# Patient Record
Sex: MALE | Race: WHITE | Employment: FULL TIME | URBAN - METROPOLITAN AREA
[De-identification: names, ages, dates, MRNs, and addresses within clinical notes are randomized per-mention and may not be internally consistent; named-entity substitution may affect disease eponyms.]

---

## 2018-10-26 ENCOUNTER — NURSE ONLY (OUTPATIENT)
Dept: FAMILY MEDICINE CLINIC | Age: 53
End: 2018-10-26
Payer: COMMERCIAL

## 2018-10-26 DIAGNOSIS — Z23 NEED FOR INFLUENZA VACCINATION: Primary | ICD-10-CM

## 2018-10-26 PROCEDURE — 90471 IMMUNIZATION ADMIN: CPT | Performed by: FAMILY MEDICINE

## 2018-10-26 PROCEDURE — 90686 IIV4 VACC NO PRSV 0.5 ML IM: CPT | Performed by: FAMILY MEDICINE

## 2019-01-07 RX ORDER — HYDROCHLOROTHIAZIDE 25 MG/1
25 TABLET ORAL DAILY
Qty: 90 TABLET | Refills: 0 | Status: SHIPPED | OUTPATIENT
Start: 2019-01-07 | End: 2019-04-05 | Stop reason: SDUPTHER

## 2019-01-24 RX ORDER — AMLODIPINE BESYLATE 10 MG/1
10 TABLET ORAL DAILY
Qty: 90 TABLET | Refills: 3 | Status: SHIPPED | OUTPATIENT
Start: 2019-01-24 | End: 2020-01-08

## 2019-01-24 RX ORDER — OLMESARTAN MEDOXOMIL 40 MG/1
40 TABLET ORAL DAILY
Qty: 90 TABLET | Refills: 3 | Status: SHIPPED | OUTPATIENT
Start: 2019-01-24 | End: 2020-01-08

## 2019-01-25 ENCOUNTER — NURSE ONLY (OUTPATIENT)
Dept: FAMILY MEDICINE CLINIC | Age: 54
End: 2019-01-25
Payer: COMMERCIAL

## 2019-01-25 ENCOUNTER — HOSPITAL ENCOUNTER (OUTPATIENT)
Age: 54
Discharge: HOME | End: 2019-01-27
Payer: COMMERCIAL

## 2019-01-25 DIAGNOSIS — R73.9 HYPERGLYCEMIA: ICD-10-CM

## 2019-01-25 DIAGNOSIS — I10 ESSENTIAL HYPERTENSION: ICD-10-CM

## 2019-01-25 DIAGNOSIS — E55.9 VITAMIN D DEFICIENCY: ICD-10-CM

## 2019-01-25 DIAGNOSIS — I10 ESSENTIAL HYPERTENSION: Primary | ICD-10-CM

## 2019-01-25 LAB
ALBUMIN SERPL-MCNC: 4.5 G/DL (ref 3.5–5.2)
ALP BLD-CCNC: 61 U/L (ref 40–129)
ALT SERPL-CCNC: 46 U/L (ref 0–40)
ANION GAP SERPL CALCULATED.3IONS-SCNC: 15 MMOL/L (ref 7–16)
AST SERPL-CCNC: 32 U/L (ref 0–39)
BASOPHILS ABSOLUTE: 0.02 E9/L (ref 0–0.2)
BASOPHILS RELATIVE PERCENT: 0.3 % (ref 0–2)
BILIRUB SERPL-MCNC: 0.9 MG/DL (ref 0–1.2)
BUN BLDV-MCNC: 14 MG/DL (ref 6–20)
CALCIUM SERPL-MCNC: 9 MG/DL (ref 8.6–10.2)
CHLORIDE BLD-SCNC: 101 MMOL/L (ref 98–107)
CHOLESTEROL, TOTAL: 180 MG/DL (ref 0–199)
CO2: 24 MMOL/L (ref 22–29)
CREAT SERPL-MCNC: 0.8 MG/DL (ref 0.7–1.2)
EOSINOPHILS ABSOLUTE: 0.13 E9/L (ref 0.05–0.5)
EOSINOPHILS RELATIVE PERCENT: 1.8 % (ref 0–6)
GFR AFRICAN AMERICAN: >60
GFR NON-AFRICAN AMERICAN: >60 ML/MIN/1.73
GLUCOSE BLD-MCNC: 99 MG/DL (ref 74–99)
HBA1C MFR BLD: 5.5 % (ref 4–5.6)
HCT VFR BLD CALC: 48.6 % (ref 37–54)
HDLC SERPL-MCNC: 45 MG/DL
HEMOGLOBIN: 16.6 G/DL (ref 12.5–16.5)
IMMATURE GRANULOCYTES #: 0.02 E9/L
IMMATURE GRANULOCYTES %: 0.3 % (ref 0–5)
LDL CHOLESTEROL CALCULATED: 120 MG/DL (ref 0–99)
LYMPHOCYTES ABSOLUTE: 1.46 E9/L (ref 1.5–4)
LYMPHOCYTES RELATIVE PERCENT: 20.3 % (ref 20–42)
MCH RBC QN AUTO: 30.2 PG (ref 26–35)
MCHC RBC AUTO-ENTMCNC: 34.2 % (ref 32–34.5)
MCV RBC AUTO: 88.5 FL (ref 80–99.9)
MONOCYTES ABSOLUTE: 0.63 E9/L (ref 0.1–0.95)
MONOCYTES RELATIVE PERCENT: 8.8 % (ref 2–12)
NEUTROPHILS ABSOLUTE: 4.94 E9/L (ref 1.8–7.3)
NEUTROPHILS RELATIVE PERCENT: 68.5 % (ref 43–80)
PDW BLD-RTO: 12.7 FL (ref 11.5–15)
PLATELET # BLD: 220 E9/L (ref 130–450)
PMV BLD AUTO: 10.5 FL (ref 7–12)
POTASSIUM SERPL-SCNC: 3.7 MMOL/L (ref 3.5–5)
RBC # BLD: 5.49 E12/L (ref 3.8–5.8)
SODIUM BLD-SCNC: 140 MMOL/L (ref 132–146)
TOTAL PROTEIN: 7 G/DL (ref 6.4–8.3)
TRIGL SERPL-MCNC: 77 MG/DL (ref 0–149)
VITAMIN D 25-HYDROXY: 43 NG/ML (ref 30–100)
VLDLC SERPL CALC-MCNC: 15 MG/DL
WBC # BLD: 7.2 E9/L (ref 4.5–11.5)

## 2019-01-25 PROCEDURE — 80053 COMPREHEN METABOLIC PANEL: CPT

## 2019-01-25 PROCEDURE — 83036 HEMOGLOBIN GLYCOSYLATED A1C: CPT

## 2019-01-25 PROCEDURE — 85025 COMPLETE CBC W/AUTO DIFF WBC: CPT

## 2019-01-25 PROCEDURE — 36415 COLL VENOUS BLD VENIPUNCTURE: CPT | Performed by: FAMILY MEDICINE

## 2019-01-25 PROCEDURE — 80061 LIPID PANEL: CPT

## 2019-01-25 PROCEDURE — 82306 VITAMIN D 25 HYDROXY: CPT

## 2019-01-29 ENCOUNTER — OFFICE VISIT (OUTPATIENT)
Dept: FAMILY MEDICINE CLINIC | Age: 54
End: 2019-01-29
Payer: COMMERCIAL

## 2019-01-29 VITALS
BODY MASS INDEX: 37.36 KG/M2 | SYSTOLIC BLOOD PRESSURE: 122 MMHG | HEART RATE: 69 BPM | TEMPERATURE: 98.4 F | OXYGEN SATURATION: 98 % | HEIGHT: 69 IN | WEIGHT: 252.2 LBS | RESPIRATION RATE: 16 BRPM | DIASTOLIC BLOOD PRESSURE: 84 MMHG

## 2019-01-29 DIAGNOSIS — E55.9 VITAMIN D DEFICIENCY: ICD-10-CM

## 2019-01-29 DIAGNOSIS — Z12.5 SCREENING FOR PROSTATE CANCER: ICD-10-CM

## 2019-01-29 DIAGNOSIS — E66.01 CLASS 2 SEVERE OBESITY DUE TO EXCESS CALORIES WITH SERIOUS COMORBIDITY AND BODY MASS INDEX (BMI) OF 37.0 TO 37.9 IN ADULT (HCC): ICD-10-CM

## 2019-01-29 DIAGNOSIS — R73.9 HYPERGLYCEMIA: ICD-10-CM

## 2019-01-29 DIAGNOSIS — R79.89 ELEVATED LFTS: ICD-10-CM

## 2019-01-29 DIAGNOSIS — Z00.00 ANNUAL PHYSICAL EXAM: Primary | ICD-10-CM

## 2019-01-29 DIAGNOSIS — I10 ESSENTIAL HYPERTENSION: ICD-10-CM

## 2019-01-29 PROCEDURE — 99396 PREV VISIT EST AGE 40-64: CPT | Performed by: FAMILY MEDICINE

## 2019-01-29 ASSESSMENT — PATIENT HEALTH QUESTIONNAIRE - PHQ9
SUM OF ALL RESPONSES TO PHQ9 QUESTIONS 1 & 2: 0
SUM OF ALL RESPONSES TO PHQ QUESTIONS 1-9: 0
SUM OF ALL RESPONSES TO PHQ QUESTIONS 1-9: 0
2. FEELING DOWN, DEPRESSED OR HOPELESS: 0
1. LITTLE INTEREST OR PLEASURE IN DOING THINGS: 0

## 2019-04-05 RX ORDER — HYDROCHLOROTHIAZIDE 25 MG/1
TABLET ORAL
Qty: 90 TABLET | Refills: 0 | Status: SHIPPED | OUTPATIENT
Start: 2019-04-05 | End: 2019-07-03 | Stop reason: SDUPTHER

## 2019-07-03 RX ORDER — HYDROCHLOROTHIAZIDE 25 MG/1
TABLET ORAL
Qty: 90 TABLET | Refills: 0 | Status: SHIPPED | OUTPATIENT
Start: 2019-07-03 | End: 2019-10-08 | Stop reason: SDUPTHER

## 2019-10-08 RX ORDER — HYDROCHLOROTHIAZIDE 25 MG/1
TABLET ORAL
Qty: 90 TABLET | Refills: 0 | Status: SHIPPED | OUTPATIENT
Start: 2019-10-08 | End: 2019-10-09 | Stop reason: SDUPTHER

## 2019-10-08 RX ORDER — HYDROCHLOROTHIAZIDE 25 MG/1
TABLET ORAL
Qty: 90 TABLET | Refills: 0 | Status: SHIPPED | OUTPATIENT
Start: 2019-10-08 | End: 2019-10-08 | Stop reason: SDUPTHER

## 2019-10-09 RX ORDER — HYDROCHLOROTHIAZIDE 25 MG/1
TABLET ORAL
Qty: 90 TABLET | Refills: 0 | Status: SHIPPED
Start: 2019-10-09 | End: 2020-03-09

## 2019-10-28 ENCOUNTER — NURSE ONLY (OUTPATIENT)
Dept: FAMILY MEDICINE CLINIC | Age: 54
End: 2019-10-28
Payer: COMMERCIAL

## 2019-10-28 DIAGNOSIS — Z23 NEED FOR INFLUENZA VACCINATION: Primary | ICD-10-CM

## 2019-10-28 PROCEDURE — 90471 IMMUNIZATION ADMIN: CPT | Performed by: FAMILY MEDICINE

## 2019-10-28 PROCEDURE — 90686 IIV4 VACC NO PRSV 0.5 ML IM: CPT | Performed by: FAMILY MEDICINE

## 2019-11-20 ENCOUNTER — NURSE ONLY (OUTPATIENT)
Dept: FAMILY MEDICINE CLINIC | Age: 54
End: 2019-11-20
Payer: COMMERCIAL

## 2019-11-20 ENCOUNTER — HOSPITAL ENCOUNTER (OUTPATIENT)
Age: 54
Discharge: HOME | End: 2019-11-22
Payer: COMMERCIAL

## 2019-11-20 DIAGNOSIS — I10 ESSENTIAL HYPERTENSION: ICD-10-CM

## 2019-11-20 DIAGNOSIS — E55.9 VITAMIN D DEFICIENCY: ICD-10-CM

## 2019-11-20 DIAGNOSIS — Z12.5 SCREENING FOR PROSTATE CANCER: ICD-10-CM

## 2019-11-20 DIAGNOSIS — R73.9 HYPERGLYCEMIA: ICD-10-CM

## 2019-11-20 LAB
ALBUMIN SERPL-MCNC: 4.4 G/DL (ref 3.5–5.2)
ALP BLD-CCNC: 70 U/L (ref 40–129)
ALT SERPL-CCNC: 74 U/L (ref 0–40)
ANION GAP SERPL CALCULATED.3IONS-SCNC: 15 MMOL/L (ref 7–16)
AST SERPL-CCNC: 44 U/L (ref 0–39)
BASOPHILS ABSOLUTE: 0.03 E9/L (ref 0–0.2)
BASOPHILS RELATIVE PERCENT: 0.3 % (ref 0–2)
BILIRUB SERPL-MCNC: 0.7 MG/DL (ref 0–1.2)
BUN BLDV-MCNC: 14 MG/DL (ref 6–20)
CALCIUM SERPL-MCNC: 9.4 MG/DL (ref 8.6–10.2)
CHLORIDE BLD-SCNC: 101 MMOL/L (ref 98–107)
CHOLESTEROL, TOTAL: 191 MG/DL (ref 0–199)
CO2: 23 MMOL/L (ref 22–29)
CREAT SERPL-MCNC: 1 MG/DL (ref 0.7–1.2)
EOSINOPHILS ABSOLUTE: 0.16 E9/L (ref 0.05–0.5)
EOSINOPHILS RELATIVE PERCENT: 1.8 % (ref 0–6)
GFR AFRICAN AMERICAN: >60
GFR NON-AFRICAN AMERICAN: >60 ML/MIN/1.73
GLUCOSE BLD-MCNC: 146 MG/DL (ref 74–99)
HBA1C MFR BLD: 6.4 % (ref 4–5.6)
HCT VFR BLD CALC: 49.9 % (ref 37–54)
HDLC SERPL-MCNC: 52 MG/DL
HEMOGLOBIN: 16.2 G/DL (ref 12.5–16.5)
IMMATURE GRANULOCYTES #: 0.06 E9/L
IMMATURE GRANULOCYTES %: 0.7 % (ref 0–5)
LDL CHOLESTEROL CALCULATED: 115 MG/DL (ref 0–99)
LYMPHOCYTES ABSOLUTE: 1.75 E9/L (ref 1.5–4)
LYMPHOCYTES RELATIVE PERCENT: 19.4 % (ref 20–42)
MCH RBC QN AUTO: 29.8 PG (ref 26–35)
MCHC RBC AUTO-ENTMCNC: 32.5 % (ref 32–34.5)
MCV RBC AUTO: 91.7 FL (ref 80–99.9)
MONOCYTES ABSOLUTE: 0.82 E9/L (ref 0.1–0.95)
MONOCYTES RELATIVE PERCENT: 9.1 % (ref 2–12)
NEUTROPHILS ABSOLUTE: 6.22 E9/L (ref 1.8–7.3)
NEUTROPHILS RELATIVE PERCENT: 68.7 % (ref 43–80)
PDW BLD-RTO: 13.1 FL (ref 11.5–15)
PLATELET # BLD: 230 E9/L (ref 130–450)
PMV BLD AUTO: 10.2 FL (ref 7–12)
POTASSIUM SERPL-SCNC: 3.7 MMOL/L (ref 3.5–5)
PROSTATE SPECIFIC ANTIGEN: 0.87 NG/ML (ref 0–4)
RBC # BLD: 5.44 E12/L (ref 3.8–5.8)
SODIUM BLD-SCNC: 139 MMOL/L (ref 132–146)
TOTAL PROTEIN: 7.2 G/DL (ref 6.4–8.3)
TRIGL SERPL-MCNC: 119 MG/DL (ref 0–149)
VITAMIN D 25-HYDROXY: 43 NG/ML (ref 30–100)
VLDLC SERPL CALC-MCNC: 24 MG/DL
WBC # BLD: 9 E9/L (ref 4.5–11.5)

## 2019-11-20 PROCEDURE — 83036 HEMOGLOBIN GLYCOSYLATED A1C: CPT

## 2019-11-20 PROCEDURE — 82306 VITAMIN D 25 HYDROXY: CPT

## 2019-11-20 PROCEDURE — 36415 COLL VENOUS BLD VENIPUNCTURE: CPT | Performed by: FAMILY MEDICINE

## 2019-11-20 PROCEDURE — 85025 COMPLETE CBC W/AUTO DIFF WBC: CPT

## 2019-11-20 PROCEDURE — G0103 PSA SCREENING: HCPCS

## 2019-11-20 PROCEDURE — 80053 COMPREHEN METABOLIC PANEL: CPT

## 2019-11-20 PROCEDURE — 80061 LIPID PANEL: CPT

## 2019-11-26 ENCOUNTER — OFFICE VISIT (OUTPATIENT)
Dept: FAMILY MEDICINE CLINIC | Age: 54
End: 2019-11-26
Payer: COMMERCIAL

## 2019-11-26 VITALS
SYSTOLIC BLOOD PRESSURE: 130 MMHG | BODY MASS INDEX: 39.34 KG/M2 | HEART RATE: 63 BPM | DIASTOLIC BLOOD PRESSURE: 86 MMHG | TEMPERATURE: 98.7 F | WEIGHT: 265.6 LBS | RESPIRATION RATE: 16 BRPM | OXYGEN SATURATION: 99 % | HEIGHT: 69 IN

## 2019-11-26 DIAGNOSIS — R10.31 RLQ ABDOMINAL PAIN: Primary | ICD-10-CM

## 2019-11-26 DIAGNOSIS — I10 ESSENTIAL HYPERTENSION: ICD-10-CM

## 2019-11-26 DIAGNOSIS — R73.9 HYPERGLYCEMIA: ICD-10-CM

## 2019-11-26 PROCEDURE — 99214 OFFICE O/P EST MOD 30 MIN: CPT | Performed by: FAMILY MEDICINE

## 2019-12-06 ENCOUNTER — HOSPITAL ENCOUNTER (OUTPATIENT)
Dept: CT IMAGING | Age: 54
Discharge: HOME | End: 2019-12-08
Payer: COMMERCIAL

## 2019-12-06 DIAGNOSIS — R10.31 RLQ ABDOMINAL PAIN: ICD-10-CM

## 2019-12-06 PROCEDURE — 2580000003 HC RX 258: Performed by: RADIOLOGY

## 2019-12-06 PROCEDURE — 6360000004 HC RX CONTRAST MEDICATION: Performed by: RADIOLOGY

## 2019-12-06 PROCEDURE — 74177 CT ABD & PELVIS W/CONTRAST: CPT

## 2019-12-06 RX ORDER — SODIUM CHLORIDE 0.9 % (FLUSH) 0.9 %
10 SYRINGE (ML) INJECTION PRN
Status: COMPLETED | OUTPATIENT
Start: 2019-12-06 | End: 2019-12-06

## 2019-12-06 RX ADMIN — IOHEXOL 50 ML: 240 INJECTION, SOLUTION INTRATHECAL; INTRAVASCULAR; INTRAVENOUS; ORAL at 15:14

## 2019-12-06 RX ADMIN — IOPAMIDOL 110 ML: 755 INJECTION, SOLUTION INTRAVENOUS at 16:15

## 2019-12-06 RX ADMIN — SODIUM CHLORIDE, PRESERVATIVE FREE 10 ML: 5 INJECTION INTRAVENOUS at 16:10

## 2020-01-08 RX ORDER — OLMESARTAN MEDOXOMIL 40 MG/1
TABLET ORAL
Qty: 90 TABLET | Refills: 3 | Status: SHIPPED
Start: 2020-01-08 | End: 2020-12-21

## 2020-01-08 RX ORDER — AMLODIPINE BESYLATE 10 MG/1
TABLET ORAL
Qty: 90 TABLET | Refills: 3 | Status: SHIPPED
Start: 2020-01-08 | End: 2020-12-28

## 2020-03-09 RX ORDER — HYDROCHLOROTHIAZIDE 25 MG/1
TABLET ORAL
Qty: 90 TABLET | Refills: 0 | Status: SHIPPED
Start: 2020-03-09 | End: 2020-09-28

## 2020-05-18 RX ORDER — POTASSIUM CHLORIDE 1500 MG/1
TABLET, EXTENDED RELEASE ORAL
Qty: 180 TABLET | Refills: 3 | Status: SHIPPED
Start: 2020-05-18 | End: 2021-05-10

## 2020-09-28 RX ORDER — HYDROCHLOROTHIAZIDE 25 MG/1
TABLET ORAL
Qty: 90 TABLET | Refills: 0 | Status: SHIPPED
Start: 2020-09-28 | End: 2020-10-23 | Stop reason: SDUPTHER

## 2020-10-16 ENCOUNTER — NURSE ONLY (OUTPATIENT)
Dept: FAMILY MEDICINE CLINIC | Age: 55
End: 2020-10-16
Payer: COMMERCIAL

## 2020-10-16 PROCEDURE — 90471 IMMUNIZATION ADMIN: CPT | Performed by: FAMILY MEDICINE

## 2020-10-16 PROCEDURE — 90686 IIV4 VACC NO PRSV 0.5 ML IM: CPT | Performed by: FAMILY MEDICINE

## 2020-10-16 ASSESSMENT — PATIENT HEALTH QUESTIONNAIRE - PHQ9
SUM OF ALL RESPONSES TO PHQ9 QUESTIONS 1 & 2: 0
SUM OF ALL RESPONSES TO PHQ QUESTIONS 1-9: 0
1. LITTLE INTEREST OR PLEASURE IN DOING THINGS: 0
2. FEELING DOWN, DEPRESSED OR HOPELESS: 0
SUM OF ALL RESPONSES TO PHQ QUESTIONS 1-9: 0
SUM OF ALL RESPONSES TO PHQ QUESTIONS 1-9: 0

## 2020-10-23 ENCOUNTER — PATIENT MESSAGE (OUTPATIENT)
Dept: FAMILY MEDICINE CLINIC | Age: 55
End: 2020-10-23

## 2020-10-23 RX ORDER — HYDROCHLOROTHIAZIDE 25 MG/1
TABLET ORAL
Qty: 90 TABLET | Refills: 0 | Status: SHIPPED
Start: 2020-10-23 | End: 2021-01-04

## 2020-10-23 RX ORDER — ERGOCALCIFEROL 1.25 MG/1
50000 CAPSULE ORAL WEEKLY
Qty: 24 CAPSULE | Refills: 1 | Status: SHIPPED
Start: 2020-10-23 | End: 2020-10-26 | Stop reason: SDUPTHER

## 2020-10-23 NOTE — TELEPHONE ENCOUNTER
From: Teofilo Chanel  To: Kasey Escamilla MD  Sent: 10/23/2020 9:56 AM EDT  Subject: Prescription Question    Hi everyone,  next time could you please refill my \"hydroCHLOROthiazide 25 MG tablet\" prescription at the [place] [address] instead of the [facility] local pharmacy in [place], [place]? It is more convenient for me to get the prescription drugs shipped directly to my home.   Many thanks,  Jelani Rodrigues

## 2020-10-26 RX ORDER — ERGOCALCIFEROL 1.25 MG/1
50000 CAPSULE ORAL
Qty: 24 CAPSULE | Refills: 1 | Status: SHIPPED
Start: 2020-10-26 | End: 2021-03-22

## 2020-12-11 ENCOUNTER — PATIENT MESSAGE (OUTPATIENT)
Dept: FAMILY MEDICINE CLINIC | Age: 55
End: 2020-12-11

## 2020-12-11 NOTE — TELEPHONE ENCOUNTER
From: Mimi Javier  To: Jade Weiss MD  Sent: 12/11/2020 7:47 AM EST  Subject: Non-Urgent Medical Question    I have an appointment on Monday morning 12/14/2020 for a fasting lab. In addition to all the tests that Dr. [place] Bernard typically requires, I was wondering if it would be possible to test for Covid-19 antibodies. I suspect I got exposed to the [address] back in February 2020 but I was never tested. I have had no symptoms since March 2020 and I feel just fine, but I am just curious to know if I had the virus 9 months ago. If possible please add the Covid-19 antibodies to the list of tests that the lab will perform.   Many thanks,  Alan Pedroza

## 2020-12-14 ENCOUNTER — NURSE ONLY (OUTPATIENT)
Dept: FAMILY MEDICINE CLINIC | Age: 55
End: 2020-12-14

## 2020-12-15 DIAGNOSIS — I10 ESSENTIAL HYPERTENSION: ICD-10-CM

## 2020-12-15 DIAGNOSIS — R73.9 HYPERGLYCEMIA: ICD-10-CM

## 2020-12-15 DIAGNOSIS — Z12.5 SCREENING PSA (PROSTATE SPECIFIC ANTIGEN): ICD-10-CM

## 2020-12-15 LAB
ALBUMIN SERPL-MCNC: 4.2 G/DL (ref 3.5–5.2)
ALP BLD-CCNC: 67 U/L (ref 40–129)
ALT SERPL-CCNC: 39 U/L (ref 0–40)
ANION GAP SERPL CALCULATED.3IONS-SCNC: 12 MMOL/L (ref 7–16)
AST SERPL-CCNC: 31 U/L (ref 0–39)
BILIRUB SERPL-MCNC: 0.6 MG/DL (ref 0–1.2)
BUN BLDV-MCNC: 10 MG/DL (ref 6–20)
CALCIUM SERPL-MCNC: 9.1 MG/DL (ref 8.6–10.2)
CHLORIDE BLD-SCNC: 106 MMOL/L (ref 98–107)
CHOLESTEROL, TOTAL: 173 MG/DL (ref 0–199)
CO2: 24 MMOL/L (ref 22–29)
CREAT SERPL-MCNC: 0.8 MG/DL (ref 0.7–1.2)
GFR AFRICAN AMERICAN: >60
GFR NON-AFRICAN AMERICAN: >60 ML/MIN/1.73
GLUCOSE BLD-MCNC: 114 MG/DL (ref 74–99)
HBA1C MFR BLD: 6.4 % (ref 4–5.6)
HCT VFR BLD CALC: 47.7 % (ref 37–54)
HDLC SERPL-MCNC: 50 MG/DL
HEMOGLOBIN: 15.7 G/DL (ref 12.5–16.5)
LDL CHOLESTEROL CALCULATED: 109 MG/DL (ref 0–99)
MCH RBC QN AUTO: 30.2 PG (ref 26–35)
MCHC RBC AUTO-ENTMCNC: 32.9 % (ref 32–34.5)
MCV RBC AUTO: 91.7 FL (ref 80–99.9)
PDW BLD-RTO: 13 FL (ref 11.5–15)
PLATELET # BLD: 218 E9/L (ref 130–450)
PMV BLD AUTO: 10.1 FL (ref 7–12)
POTASSIUM SERPL-SCNC: 3.4 MMOL/L (ref 3.5–5)
PROSTATE SPECIFIC ANTIGEN: 0.91 NG/ML (ref 0–4)
RBC # BLD: 5.2 E12/L (ref 3.8–5.8)
SODIUM BLD-SCNC: 142 MMOL/L (ref 132–146)
TOTAL PROTEIN: 6.8 G/DL (ref 6.4–8.3)
TRIGL SERPL-MCNC: 70 MG/DL (ref 0–149)
VLDLC SERPL CALC-MCNC: 14 MG/DL
WBC # BLD: 8.1 E9/L (ref 4.5–11.5)

## 2020-12-21 RX ORDER — OLMESARTAN MEDOXOMIL 40 MG/1
TABLET ORAL
Qty: 90 TABLET | Refills: 3 | Status: SHIPPED
Start: 2020-12-21 | End: 2021-12-10

## 2020-12-28 RX ORDER — AMLODIPINE BESYLATE 10 MG/1
TABLET ORAL
Qty: 90 TABLET | Refills: 3 | Status: SHIPPED
Start: 2020-12-28 | End: 2021-12-10

## 2021-01-04 RX ORDER — HYDROCHLOROTHIAZIDE 25 MG/1
TABLET ORAL
Qty: 90 TABLET | Refills: 0 | Status: SHIPPED
Start: 2021-01-04 | End: 2021-05-10

## 2021-03-02 ENCOUNTER — OFFICE VISIT (OUTPATIENT)
Dept: FAMILY MEDICINE CLINIC | Age: 56
End: 2021-03-02
Payer: COMMERCIAL

## 2021-03-02 VITALS
BODY MASS INDEX: 39.03 KG/M2 | HEART RATE: 79 BPM | RESPIRATION RATE: 16 BRPM | WEIGHT: 263.5 LBS | SYSTOLIC BLOOD PRESSURE: 148 MMHG | TEMPERATURE: 97.8 F | HEIGHT: 69 IN | OXYGEN SATURATION: 98 % | DIASTOLIC BLOOD PRESSURE: 86 MMHG

## 2021-03-02 DIAGNOSIS — S89.92XA INJURY OF LEFT LOWER EXTREMITY, INITIAL ENCOUNTER: ICD-10-CM

## 2021-03-02 DIAGNOSIS — R94.31 EKG ABNORMALITIES: ICD-10-CM

## 2021-03-02 DIAGNOSIS — L84 CORN OF FOOT: ICD-10-CM

## 2021-03-02 DIAGNOSIS — R01.1 HEART MURMUR: ICD-10-CM

## 2021-03-02 DIAGNOSIS — R00.2 HEART PALPITATIONS: ICD-10-CM

## 2021-03-02 DIAGNOSIS — Z00.00 ANNUAL PHYSICAL EXAM: Primary | ICD-10-CM

## 2021-03-02 PROCEDURE — 99396 PREV VISIT EST AGE 40-64: CPT | Performed by: FAMILY MEDICINE

## 2021-03-02 PROCEDURE — 93000 ELECTROCARDIOGRAM COMPLETE: CPT | Performed by: FAMILY MEDICINE

## 2021-03-02 SDOH — ECONOMIC STABILITY: FOOD INSECURITY: WITHIN THE PAST 12 MONTHS, THE FOOD YOU BOUGHT JUST DIDN'T LAST AND YOU DIDN'T HAVE MONEY TO GET MORE.: NEVER TRUE

## 2021-03-02 SDOH — ECONOMIC STABILITY: FOOD INSECURITY: WITHIN THE PAST 12 MONTHS, YOU WORRIED THAT YOUR FOOD WOULD RUN OUT BEFORE YOU GOT MONEY TO BUY MORE.: NEVER TRUE

## 2021-03-02 SDOH — ECONOMIC STABILITY: TRANSPORTATION INSECURITY
IN THE PAST 12 MONTHS, HAS LACK OF TRANSPORTATION KEPT YOU FROM MEETINGS, WORK, OR FROM GETTING THINGS NEEDED FOR DAILY LIVING?: NO

## 2021-03-02 ASSESSMENT — PATIENT HEALTH QUESTIONNAIRE - PHQ9
1. LITTLE INTEREST OR PLEASURE IN DOING THINGS: 0
SUM OF ALL RESPONSES TO PHQ QUESTIONS 1-9: 0
SUM OF ALL RESPONSES TO PHQ9 QUESTIONS 1 & 2: 0
SUM OF ALL RESPONSES TO PHQ QUESTIONS 1-9: 0
SUM OF ALL RESPONSES TO PHQ QUESTIONS 1-9: 0
2. FEELING DOWN, DEPRESSED OR HOPELESS: 0

## 2021-03-02 NOTE — PROGRESS NOTES
Types: 3 Cans of beer per week     Comment: 2-3 beer per weekly    Drug use: No    Sexual activity: Not on file   Lifestyle    Physical activity     Days per week: Not on file     Minutes per session: Not on file    Stress: Not on file   Relationships    Social connections     Talks on phone: Not on file     Gets together: Not on file     Attends Sabianist service: Not on file     Active member of club or organization: Not on file     Attends meetings of clubs or organizations: Not on file     Relationship status: Not on file    Intimate partner violence     Fear of current or ex partner: Not on file     Emotionally abused: Not on file     Physically abused: Not on file     Forced sexual activity: Not on file   Other Topics Concern    Not on file   Social History Narrative    Not on file      No Known Allergies     Review of Systems:  Constitutional:  No fever, no fatigue, no chills, no headaches, no weight change  Dermatology:  No rash, no mole, no dry or sensitive skin  ENT:  No cough, no sore throat, no sinus pain, no runny nose, no ear pain  Cardiology:  No chest pain, no palpitations, no leg edema, no shortness of breath, no PND  Endocrinology:  No polydipsia, no polyuria, no cold intolerance, no heat intolerance, no polyphagia, no hair changes  Gastroenterology:  No dysphagia, no abdominal pain, no nausea, no vomiting, no constipation, no diarrhea, no heartburn  Female Reproductive:  No hot flashes, no abnormal vaginal discharge, no pain with menstruation, no pelvic pain  Musculoskeletal:  No joint pain, no leg cramps, no back pain, no muscle aches  Respiratory:  No shortness of breath, no orthopnea, no wheezing, no GORMAN, no hemoptysis  Urology:  No blood in the urine, no urinary frequency, no urinary incontinence, no urinary urgency, no nocturia, no dysuria  Neurology:  No numbness/tingling, no dizziness, no weakness Psychology:  No depression, no sleep disturbances, no suicidal ideation, no anxiety  Physical Exam:  Vitals:    03/02/21 1341 03/02/21 1348   BP: (!) 138/94 (!) 148/86   Pulse: 79    Resp: 16    Temp: 97.8 °F (36.6 °C)    TempSrc: Temporal    SpO2: 98%    Weight: 263 lb 8 oz (119.5 kg)    Height: 5' 9\" (1.753 m)      General:  Patient alert and oriented x 3, NAD, pleasant  HEENT:  Atraumatic, normocephalic, PERRLA, EOMI, clear conjunctiva, TMs clear, nose-clear, throat - no erythema, tonsils- wnl  Neck:  Supple, no goiter, no carotid bruits, no lymphadenopathy  Lungs:  CTA B  Heart:  RRR, no murmurs, gallops or rubs  Abdomen:  Soft, NTND, + bowel sounds  Back: full ROM, no CVA tenderness  Extremities:  No clubbing, cyanosis or edema  Neuro:  CN II-XII grossly intact, 5/5 strength in bilateral upper and lower extremities, 2 + reflexes. Skin: unremarkable    Assessment/Plan:  Beba Mccain was seen today for annual exam and discuss labs. Diagnoses and all orders for this visit:    Annual physical exam    Injury of left lower extremity, initial encounter  -     XR TIBIA FIBULA LEFT (2 VIEWS); Future    [place] of foot  -     Donovan Morse ZACHARY - [facility], Podiatry, [place]    Heart palpitations  -     EKG 12 Lead; Future  -     EKG 12 Lead  -     Holter Monitor 24 Hour  -     Stress test, myoview; Future    Heart murmur  -     ECHO Complete 2D W Doppler W Color; Future    EKG abnormalities  -     Stress test, myoview; Future      As above. Call or go to ED immediately if symptoms worsen or persist.  Return in about 4 weeks (around 3/30/2021). or sooner if necessary. Educational materials and/or home exercises printed for patient's review and were included in patient instructions on his/her After Visit Summary and given to patient at the end of visit. Counseled regarding above diagnosis, including possible risks and complications,  especially if left uncontrolled. Counseled regarding the possible side effects, risks, benefits and alternatives to treatment; patient and/or guardian verbalizes understanding, agrees, feels comfortable with and wishes to proceed with above treatment plan. Advised patient to call with any new medication issues, and read all Rx info from pharmacy to assure aware of all possible risks and side effects of medication before taking. Reviewed age and gender appropriate health screening exams and vaccinations. Advised patient regarding importance of keeping up with recommended health maintenance and to schedule as soon as possible if overdue, as this is important in assessing for undiagnosed pathology, especially cancer, as well as protecting against potentially harmful/life threatening disease. Patient and/or guardian verbalizes understanding and agrees with above counseling, assessment and plan. All questions answered. Angela Phan MD  3/2/2021    I have personally reviewed and updated the chief complaint, HPI, Past Medical, Family and Social History, as well as the above Review of Systems.

## 2021-03-10 ENCOUNTER — HOSPITAL ENCOUNTER (OUTPATIENT)
Dept: SLEEP CENTER | Age: 56
Discharge: HOME | End: 2021-03-10
Payer: COMMERCIAL

## 2021-03-10 PROCEDURE — 93226 XTRNL ECG REC<48 HR SCAN A/R: CPT

## 2021-03-10 PROCEDURE — 93225 XTRNL ECG REC<48 HRS REC: CPT

## 2021-03-17 ENCOUNTER — TELEPHONE (OUTPATIENT)
Dept: CARDIOLOGY | Age: 56
End: 2021-03-17

## 2021-03-17 NOTE — TELEPHONE ENCOUNTER
Spoke with patient and scheduled nuclear stress test and echocardiogram ordered by Dr. Glory Hayden on March 29, 2021 starting at 0730. Instructions for test and COVID-19 preprocedure checklist reviewed.

## 2021-03-22 RX ORDER — ERGOCALCIFEROL 1.25 MG/1
CAPSULE ORAL
Qty: 24 CAPSULE | Refills: 1 | Status: SHIPPED
Start: 2021-03-22 | End: 2021-09-13

## 2021-03-24 ENCOUNTER — TELEPHONE (OUTPATIENT)
Dept: CARDIOLOGY | Age: 56
End: 2021-03-24

## 2021-03-24 NOTE — TELEPHONE ENCOUNTER
Nuclear stress test ordered on 3/2/21 at [facility].     Electronically signed by Arlet Kwok on 3/24/2021 at 2:02 PM

## 2021-03-24 NOTE — TELEPHONE ENCOUNTER
Nuc needs P2P with [employer]. [phone] #[phone]    Service #  [id number]    Please advise.     Electronically signed by Saira Aragon on 3/24/2021 at 9:56 AM      [phone]      Service #:  [id number]        [id number]    [id number]

## 2021-03-25 ENCOUNTER — TELEPHONE (OUTPATIENT)
Dept: CARDIOLOGY | Age: 56
End: 2021-03-25

## 2021-03-25 NOTE — TELEPHONE ENCOUNTER
Left message on voice mail reminding patient of nuclear stress test and echocardiogram appointments on March 29, 2021 at 0730. Instructions for tests left on voice mail and asked patient to call office with any questions or if not feeling well.

## 2021-03-29 ENCOUNTER — HOSPITAL ENCOUNTER (OUTPATIENT)
Dept: CARDIOLOGY | Age: 56
Discharge: HOME | End: 2021-03-29
Payer: COMMERCIAL

## 2021-03-29 VITALS
BODY MASS INDEX: 37.77 KG/M2 | TEMPERATURE: 97.7 F | DIASTOLIC BLOOD PRESSURE: 80 MMHG | HEIGHT: 69 IN | HEART RATE: 67 BPM | WEIGHT: 255 LBS | RESPIRATION RATE: 18 BRPM | SYSTOLIC BLOOD PRESSURE: 128 MMHG

## 2021-03-29 DIAGNOSIS — R00.2 HEART PALPITATIONS: ICD-10-CM

## 2021-03-29 DIAGNOSIS — R94.31 EKG ABNORMALITIES: Primary | ICD-10-CM

## 2021-03-29 DIAGNOSIS — R01.1 HEART MURMUR: ICD-10-CM

## 2021-03-29 LAB
LV EF: 58 %
LVEF MODALITY: NORMAL

## 2021-03-29 PROCEDURE — 93017 CV STRESS TEST TRACING ONLY: CPT

## 2021-03-29 PROCEDURE — A9500 TC99M SESTAMIBI: HCPCS | Performed by: INTERNAL MEDICINE

## 2021-03-29 PROCEDURE — 3430000000 HC RX DIAGNOSTIC RADIOPHARMACEUTICAL: Performed by: INTERNAL MEDICINE

## 2021-03-29 PROCEDURE — 93306 TTE W/DOPPLER COMPLETE: CPT

## 2021-03-29 PROCEDURE — 78452 HT MUSCLE IMAGE SPECT MULT: CPT

## 2021-03-29 PROCEDURE — 2580000003 HC RX 258: Performed by: INTERNAL MEDICINE

## 2021-03-29 RX ORDER — SODIUM CHLORIDE 0.9 % (FLUSH) 0.9 %
10 SYRINGE (ML) INJECTION PRN
Status: DISCONTINUED | OUTPATIENT
Start: 2021-03-29 | End: 2021-03-30 | Stop reason: HOSPADM

## 2021-03-29 RX ADMIN — SODIUM CHLORIDE, PRESERVATIVE FREE 10 ML: 5 INJECTION INTRAVENOUS at 09:48

## 2021-03-29 RX ADMIN — Medication 10 MILLICURIE: at 07:57

## 2021-03-29 RX ADMIN — Medication 31.5 MILLICURIE: at 09:48

## 2021-03-29 RX ADMIN — SODIUM CHLORIDE, PRESERVATIVE FREE 10 ML: 5 INJECTION INTRAVENOUS at 07:57

## 2021-03-29 NOTE — TELEPHONE ENCOUNTER
No auth required thru [employer]. Patient is still scheduled for stress. Thanks.     Electronically signed by Yumi Torres on 3/29/2021 at 7:19 AM

## 2021-03-29 NOTE — PROCEDURES
[address] [facility] [address]  [phone]                Exercise Stress Nuclear Gated SPECT Study    Name: [place] Account Number: [de-identified]    :  1965      Sex: male              Date of Study:  3/29/2021    Height: 5' 9\" (175.3 cm)  Weight: 255 lb (115.7 kg)     Ordering Provider: Veronika Presley MD          PCP: Shannan Gama MD      Cardiologist: none                        Interpreting Physician: Chris Zarate MD  _________________________________________________________________________________    Indication:   Detecting the presence and location of coronary artery disease    Clinical History:   Patient has no known history of coronary artery disease. Exercise: The patient exercised using a Neymar protocol, completing 9:00 minutes and reaching an estimated work load of 11.1 metabolic equivalents (METS). Resting HR was 67. Peak exercise heart rate was 148 ( 89% of maximum predicted heart rate for age). Baseline /80. Peak exercise /78. The blood pressure response to exercise was normal      Exercise was terminated due to heart rate attained and fatigue. The patient experienced no chest pain with exercise. Exercise ECG:   The patient demonstrated frequent PVC's' during exercise. With exercise, there were no ST segment changes of significance at the heart rate achieved. IMAGING: Myocardial perfusion imaging was performed at rest 30-35 minutes following the intravenous injection of 10.1 mCi of (Tc-Sestamibi) followed by 10 ml of Normal Saline. At peak exercise, the patient was injected intravenously with 31.5 mCi of (Tc-Sestamibi) followed by 10 ml of Normal Saline. Gated post-stress tomographic imaging was performed 20-25 minutes after stress. FINDINGS: The overall quality of the study was excellent.      Left ventricular cavity size was noted to be normal.    Rotational analog analysis demonstrated no patient motion or abnormal extracardiac radioactivity. The gated SPECT stress imaging in the short, vertical long, and horizontal long axis demonstrated normal homogeneous tracer distribution throughout the myocardium. The resting images are normal.     Gated SPECT left ventricular ejection fraction was calculated to be 59%, with normal myocardial thickening and wall motion. Impression:    1. Exercise EKG was negative. 2. The patient experienced no chest pain with exercise. 3. The myocardial perfusion imaging was normal.    4. Overall left ventricular systolic function was normal without regional wall motion abnormalities. 5. Exercise capacity was average. 6. Low risk general exercise nuclear treadmill test.    Thank you for sending your patient to this [employer].      Electronically signed by Jonathan Walter MD on 3/29/21 at 2:33 PM EDT

## 2021-03-30 ENCOUNTER — OFFICE VISIT (OUTPATIENT)
Dept: FAMILY MEDICINE CLINIC | Age: 56
End: 2021-03-30
Payer: COMMERCIAL

## 2021-03-30 VITALS
HEART RATE: 68 BPM | OXYGEN SATURATION: 98 % | WEIGHT: 266.2 LBS | RESPIRATION RATE: 16 BRPM | TEMPERATURE: 98.3 F | HEIGHT: 69 IN | BODY MASS INDEX: 39.43 KG/M2 | DIASTOLIC BLOOD PRESSURE: 79 MMHG | SYSTOLIC BLOOD PRESSURE: 119 MMHG

## 2021-03-30 DIAGNOSIS — I49.3 PVC (PREMATURE VENTRICULAR CONTRACTION): ICD-10-CM

## 2021-03-30 DIAGNOSIS — Q23.1 BICUSPID AORTIC VALVE: Primary | ICD-10-CM

## 2021-03-30 PROCEDURE — 99214 OFFICE O/P EST MOD 30 MIN: CPT | Performed by: FAMILY MEDICINE

## 2021-03-30 NOTE — PROGRESS NOTES
Chief Complaint   Patient presents with    Results     stress test, echo, and holter monitor       HPI:  Patient is here for follow-up of palpitations and to discuss results of Stress test, ECHO, and holter monitor. Patient complains of lots of questions on test results. Patient's past medical, surgical, social and/or family history reviewed, updated in chart, and are non-contributory (unless otherwise stated). Medications and allergies also reviewed and updated in chart. Review of Systems:  Constitutional:  No fever, no fatigue, no chills, no headaches, no weight change  Dermatology:  No rash, no mole, no dry or sensitive skin  ENT:  No cough, no sore throat, no sinus pain, no runny nose, no ear pain  Cardiology:  No chest pain, no palpitations, no leg edema, no shortness of breath, no PND  Gastroenterology:  No dysphagia, no abdominal pain, no nausea, no vomiting, no constipation, no diarrhea, no heartburn  Musculoskeletal:  No joint pain, no leg cramps, no back pain, no muscle aches  Respiratory:  No shortness of breath, no orthopnea, no wheezing, no GORMAN, no hemoptysis  Urology:  No blood in the urine, no urinary frequency, no urinary incontinence, no urinary urgency, no nocturia, no dysuria  Vitals:    03/30/21 1347   BP: 119/79   Pulse: 68   Resp: 16   Temp: 98.3 °F (36.8 °C)   TempSrc: Temporal   SpO2: 98%   Weight: 266 lb 3.2 oz (120.7 kg)   Height: 5' 9\" (1.753 m)       General:  Patient alert and oriented x 3, NAD, pleasant  HEENT:  Atraumatic, normocephalic, PERRLA, EOMI, clear conjunctiva, TMs clear, nose-clear, throat - no erythema  Neck:  Supple, no goiter, no carotid bruits, no lymphadenopathy  Lungs:  CTA B  Heart:  RRR, no murmurs, gallops or rubs  Abdomen:  Soft/nt/nd, + bowel sounds  Extremities:  No clubbing, cyanosis or edema  Skin: unremarkable    Assessment/Plan:      Nora Banks was seen today for results.     Diagnoses and all orders for this visit:    Bicuspid aortic valve  - Silverio Plaza MD, Cardiology, [place]    PVC (premature ventricular contraction)    Over 30 mins spent with the patient and > 50% was spent on counseling and care coordination. As above. Call or go to ED immediately if symptoms worsen or persist.  Return in about 3 months (around 6/30/2021) for fasting/ov., or sooner if necessary. Educational materials and/or home exercises printed for patient's review and were included in patient instructions on his/her After Visit Summary and given to patient at the end of visit. Counseled regarding above diagnosis, including possible risks and complications,  especially if left uncontrolled. Counseled regarding the possible side effects, risks, benefits and alternatives to treatment; patient and/or guardian verbalizes understanding, agrees, feels comfortable with and wishes to proceed with above treatment plan. Advised patient to call with any new medication issues, and read all Rx info from pharmacy to assure aware of all possible risks and side effects of medication before taking. Reviewed age and gender appropriate health screening exams and vaccinations. Advised patient regarding importance of keeping up with recommended health maintenance and to schedule as soon as possible if overdue, as this is important in assessing for undiagnosed pathology, especially cancer, as well as protecting against potentially harmful/life threatening disease. Patient and/or guardian verbalizes understanding and agrees with above counseling, assessment and plan. All questions answered. Ju Page MD  3/31/2021    I have personally reviewed and updated the chief complaint, HPI, Past Medical, Family and Social History, as well as the above Review of Systems.

## 2021-03-31 ENCOUNTER — TELEPHONE (OUTPATIENT)
Dept: NON INVASIVE DIAGNOSTICS | Age: 56
End: 2021-03-31

## 2021-04-07 ENCOUNTER — TELEPHONE (OUTPATIENT)
Dept: ADMINISTRATIVE | Age: 56
End: 2021-04-07

## 2021-04-07 NOTE — TELEPHONE ENCOUNTER
Patient Appointment Form:      PCP: Dr. Judi De  Referring: Dr. Judi De    Has the Patient:    Seen a Cardiologist? no    Had a heart catheterization? no    Had heart surgery? no    Had a stress test or nuclear stress test? {outcome:56045}    Had an echocardiogram? {outcome:19929}    Had a vascular ultrasound? {outcome:43231}    Had a 24/48 heart monitor or extended cardiac event monitor? {outcome:32247}    Had recent blood work in the last 6 months? {outcome:93455}    Had a pacemaker/ICD/ILR implant?  {outcome:66905}    Seen an Electrophysiologist? {outcome:17634}        Will send records via: {outcome:80418}      Date & time of appointment:  ***

## 2021-04-07 NOTE — TELEPHONE ENCOUNTER
Patient Appointment Form:      PCP: Dr. Cricket Hanna  Referring: Dr. Cricket Hanna    Has the Patient:    Seen a Cardiologist? yes    date:3/29/21  Physician:Jie  [facility]    Had a heart catheterization? no    Had heart surgery? no    Had a stress test or nuclear stress test? yes   date: 3/29/21   facility name:  [facility])    Had an echocardiogram? yes   date: 3/29/21   facility name:  [facility])    Had a vascular ultrasound? no    Had a 24/48 heart monitor or extended cardiac event monitor?  24hr   date: 3/2/21      Who ordered: Zita Singh    Had recent blood work in the last 6 months? yes    date: 12/14/20    ordering physician: Zita Singh    Had a pacemaker/ICD/ILR implant? no    Seen an Electrophysiologist? no        Will send records via: in Epic      Date & time of appointment:  [email or web address]

## 2021-04-16 ENCOUNTER — OFFICE VISIT (OUTPATIENT)
Dept: PODIATRY | Age: 56
End: 2021-04-16
Payer: COMMERCIAL

## 2021-04-16 VITALS — HEIGHT: 69 IN | BODY MASS INDEX: 39.4 KG/M2 | WEIGHT: 266 LBS

## 2021-04-16 DIAGNOSIS — M77.42 METATARSALGIA OF BOTH FEET: ICD-10-CM

## 2021-04-16 DIAGNOSIS — M79.672 PAIN IN BOTH FEET: ICD-10-CM

## 2021-04-16 DIAGNOSIS — L84 CORNS AND CALLUS: Primary | ICD-10-CM

## 2021-04-16 DIAGNOSIS — M79.671 PAIN IN BOTH FEET: ICD-10-CM

## 2021-04-16 DIAGNOSIS — M77.41 METATARSALGIA OF BOTH FEET: ICD-10-CM

## 2021-04-16 DIAGNOSIS — R26.2 DIFFICULTY WALKING: ICD-10-CM

## 2021-04-16 PROCEDURE — 99243 OFF/OP CNSLTJ NEW/EST LOW 30: CPT | Performed by: PODIATRIST

## 2021-04-16 NOTE — LETTER
[facility], [address] [place] [address]    Phone: [phone]  Fax: [phone]    Oli Ancelmo Banks  [id number]   1965  4/16/2021      Dear Martha Gonzalez,    I would like to thank you for the kind referral of Jennifer Banks. He presented to the office today for evaluation regarding painful corns to the plantar aspect of both ball of the foot regions. Patient wanted to discuss options available. Debridement hyperkeratotic areas was performed to patient tolerance. We did recommend utilizing OTC insoles with metatarsal head relief and orthopedic sandals to give him added support into the arch and forefoot regions to prevent reoccurrence. He was advised continued use of the topical moisturizing cream he has been presently applying. We will have continued follow-up until issues are improved. If you should have any questions concerning his visit today, please do not hesitate to contact me.     Sincerely,    [facility], [place]

## 2021-04-16 NOTE — PROGRESS NOTES
Patient is here for corns and callouses on the bottom of bilateral feet.  Radha Centeno MD 3/30/21ot  Electronically signed by Chun Kaur LPN on 6/07/3821 at 8:72 AM

## 2021-04-16 NOTE — PROGRESS NOTES
21     Gurmeet Winn    : 1965 Sex: male   Age: 48 y.o. Patient was referred by: Princess Mel MD  Patient's PCP/Provider is:  Princess Mel MD    Subjective:    Patient is seen today chief complaint of painful calluses to the plantar aspect of both feet. Chief Complaint   Patient presents with    Callouses    Other     corns       HPI: Patient stated the issues been going on for several months now and progressively gotten worse. He has been working from home over the last year and does not wear shoes at home which has contributed to his callus issues. He denies any recent injuries or changes in activities. He wanted to discuss treatment options available at this time. ROS:  Const: Positives and pertinent negatives as per HPI. Musculo: Denies symptoms other than stated above. Neuro: Denies symptoms other than stated above. Skin: Denies symptoms other than stated above. Current Medications:    Current Outpatient Medications:     vitamin D (ERGOCALCIFEROL) 1.25 MG (87125 UT) CAPS capsule, TAKE 1 CAPSULE TWICE WEEKLY, Disp: 24 capsule, Rfl: 1    hydroCHLOROthiazide (HYDRODIURIL) 25 MG tablet, TAKE 1 TABLET BY MOUTH EVERY DAY, Disp: 90 tablet, Rfl: 0    amLODIPine (NORVASC) 10 MG tablet, TAKE 1 TABLET DAILY, Disp: 90 tablet, Rfl: 3    olmesartan (BENICAR) 40 MG tablet, TAKE 1 TABLET DAILY, Disp: 90 tablet, Rfl: 3    KLOR-CON M20 20 MEQ extended release tablet, TAKE 2 TABLETS DAILY, Disp: 180 tablet, Rfl: 3    potassium chloride (KLOR-CON M20) 20 MEQ extended release tablet, TAKE 2 TABLETS DAILY, Disp: 180 tablet, Rfl: 3    aspirin 81 MG chewable tablet, Take 1 tablet by mouth daily.  (Patient taking differently: Take 81 mg by mouth daily Prescribed per PCP; states instructed to hold 5 days preop per Dr Brad Bales), Disp: 30 tablet, Rfl: 3    Allergies:  No Known Allergies    Vitals:    21 0939   Weight: 266 lb (120.7 kg)   Height: 5' 9\" (1.753 m) Past Medical History:   Diagnosis Date    Encounter for screening colonoscopy 01/2018    Hepatitis     Hepatitis C antibody    Hypertension     Sleep apnea with use of continuous positive airway pressure (CPAP)      Family History   Problem Relation Age of Onset    Cancer Mother         bladder x6    Glaucoma Mother     High Blood Pressure Mother     High Blood Pressure Father     Heart Disease Father 66        MI    Stroke Father 68    High Blood Pressure Paternal Grandmother     Heart Disease Paternal Grandmother     Other Maternal Cousin         seizures    Heart Disease Paternal Aunt     Heart Disease Paternal Grandfather     Heart Disease Paternal Cousin      Past Surgical History:   Procedure Laterality Date    COLONOSCOPY  01/22/2018    ELBOW SURGERY  2007    fall   [address]  2007    fall    WRIST SURGERY  2007    fall from ladder     Social History     Tobacco Use    Smoking status: Never Smoker    Smokeless tobacco: Never Used   Substance Use Topics    Alcohol use:  Yes     Alcohol/week: 3.0 standard drinks     Types: 3 Cans of beer per week     Comment: 2-3 beer per weekly    Drug use: No           Diagnostic studies:    Echo Complete 2d W Doppler W Color    Result Date: 3/29/2021  Transthoracic Echocardiography Report (TTE)  Demographics   Patient Name        [facility]      Gender               Male                      41 [address]  [facility]      [id number]       Room Number  Number   Account #           [de-identified]      Procedure Date       03/29/2021   Corporate ID                       Ordering Physician   Accession Number    [id number]      Referring Physician  Albert Jason   Date of Birth       1965     [address]    Age                 48 year(s)     Interpreting         Delaney Dominique MD                                     Physician                                      Any Other  Procedure Type of Study   TTE procedure:Echo Complete W/ Dop & Color Flow. Procedure Date Date: 03/29/2021 Start: 07:46 AM Study Location: Echo Lab Technical Quality: Adequate visualization Indications:Abnormal ECG. Patient Status: Routine Height: 69 inches Weight: 263 pounds BSA: 2.32 m^2 BMI: 38.84 kg/m^2 BP: 148/86 mmHg  Findings   Left Ventricle  Normal left ventricle size. Mild concentric left ventricular hypertrophy. No wall motion abnormalities. Indeterminate diastolic function. Ejection fraction is visually estimated at 55-60%. Right Ventricle  Normal right ventricle structure and function. Left Atrium  Normal left atrium. Interatrial septum not well visualized. Right Atrium  Normal right atrium. Mitral Valve  Normal mitral valve structure and function. Tricuspid Valve  Normal tricuspid valve structure and function. Aortic Valve  Bicuspid or Unicuspid Aortic Valve  Vmax = 2.3 m/sec, MG = 12, LOLIS 1.7 cm2, No insufficiency   Pulmonic Valve  Normal pulmonic valve structure and function. Pericardial Effusion  No evidence for hemodynamically significant pericardial effusion. Aorta  Normal aortic root and ascending aorta. Miscellaneous  The inferior vena cava diameter is normal with normal respiratory  variation. Conclusions   Summary  Bicuspid or Unicuspid Aortic Valve  Mild Aortic stenosis  Normal left ventricle size. Mild concentric left ventricular hypertrophy. Ejection fraction is visually estimated at 55-60%.    Signature   ----------------------------------------------------------------  Electronically signed by Neha Anthony MD(Interpreting  physician) on 03/29/2021 03:09 PM  ----------------------------------------------------------------  M-Mode/2D Measurements & Calculations   LV Diastolic    LV Systolic Dimension: 3.6   AV Cusp Separation: 1.4 cmLA  Dimension: 5.1  cm                           Dimension: 4 cmAO Root  cm              LV Volume Diastolic: 569.1   Dimension: 3 cm  LV FS:29.4 %    ml  LV PW           LV Volume Systolic: 81.4 ml  Diastolic: 1.2  LV EDV/LV EDV Index: 125.2  cm              ml/54 ml/m^2LV ESV/LV ESV    RV Diastolic Dimension: 3.2  LV PW Systolic: Index: 86.2 QO/48MQ/ m^2     cm  1.5 cm          EF Calculated: 57.1 %  Septum          LV Mass Index: 116 l/min*m^2 LA/Aorta: 4.44  Diastolic: 1.4                               Ascending Aorta: 3.2 cm  cm                                           LA volume/Index: 50.5 ml  Septum          LVOT: 2.3 cm                 /74TZ/I^9  Systolic: 1.8                                RA Area: 15.4 cm^2  cm                                               IVC Expiration: 2.2 cm  LV Mass: 270.07  g  Doppler Measurements & Calculations   MV Peak E-Wave: 0.97 AV Peak Velocity: 2.31 LVOT Peak Velocity: 0.87 m/s  m/s                  m/s                    LVOT Mean Velocity: 0.62 m/s  MV Peak A-Wave: 0.9  AV Peak Gradient:      LVOT Peak Gradient: 3 mmHgLVOT  m/s                  21.42 mmHg             Mean Gradient: 1.7 mmHg  MV E/A Ratio: 1.07   AV Mean Velocity: 1.64 Estimated RVSP: 19.2 mmHg  MV Peak Gradient:    m/s                    Estimated RAP:3 mmHg  4.9 mmHg             AV Mean Gradient: 11.9  MV Mean Gradient:    mmHg  1.6 mmHg             AV VTI: 55.4 cm        TR Velocity:2.01 m/s  MV Mean Velocity:    AV Area                TR Gradient:16.22 mmHg  0.57 m/s             (Continuity):1.69 cm^2 PV Peak Velocity: 0.48 m/s  MV Deceleration                             PV Peak Gradient: 0.93 mmHg  Time: 202.2 msec     LVOT VTI: 22.5 cm      PV Mean Velocity: 0.32 m/s  MV P1/2t: 56.8 msec  IVRT: 90 msec          PV Mean Gradient: 0.5 mmHg  MVA by PHT:3.87 cm^2 Estimated PASP: 19.22  MV Area              mmHg  (continuity): 3.1  cm^2  MV E' Septal  Velocity: 0.07 m/s  MV E' Lateral  Velocity: 11 m/s  [email or web address] [id number]        Procedures:    None    Exam:  VASCULAR: Pulses palpable bilateral foot. Capillary fill time brisk digits 1 through 5 bilateral foot. NEUROLOGICAL: Epicritic sensations intact and symmetrical  DERMATOLOGICAL: Hyperkeratotic areas are noted plantar aspect bilateral ball of the foot regions. Upon partial thickness, nonexcisional debridement no underlying ulcerations or any signs of infection noted. No plantar edema or ecchymotic skin changes present bilateral foot. MUSCULOSKELETAL: Adequate range of motion ankle, subtalar joint, and MTPJ regions bilaterally. Plan Per Assessment  Mikie Garay was seen today for callouses and other. Diagnoses and all orders for this visit:    Corns and callus    Metatarsalgia of both feet    Pain in both feet    Difficulty walking        1. New patient consultation and management  2. Partial thickness, nonexcisional debridement callused areas was performed bilateral foot to patient tolerance. 3. We did recommend purchasing OTC insoles with metatarsal offloading and orthopedic sandals to be worn at home to give him added support to prevent the calluses from reoccurring. 4. Patient will be followed up in 1 month's time or sooner if needed for reevaluation. He was advised to call the office with any questions or concerns in the interim. Seen By:    Po Green DPM    Electronically signed by Po Green DPM on 4/16/2021 at 4:11 PM      This note was created using voice recognition software. The note was reviewed however may contain grammatical errors.

## 2021-05-10 RX ORDER — POTASSIUM CHLORIDE 1500 MG/1
TABLET, EXTENDED RELEASE ORAL
Qty: 180 TABLET | Refills: 3 | Status: SHIPPED
Start: 2021-05-10 | End: 2022-04-28

## 2021-05-10 RX ORDER — HYDROCHLOROTHIAZIDE 25 MG/1
TABLET ORAL
Qty: 90 TABLET | Refills: 0 | Status: SHIPPED
Start: 2021-05-10 | End: 2021-08-16

## 2021-05-19 ENCOUNTER — OFFICE VISIT (OUTPATIENT)
Dept: PODIATRY | Age: 56
End: 2021-05-19
Payer: COMMERCIAL

## 2021-05-19 VITALS — WEIGHT: 266 LBS | BODY MASS INDEX: 39.4 KG/M2 | HEIGHT: 69 IN

## 2021-05-19 DIAGNOSIS — M77.41 METATARSALGIA OF BOTH FEET: Primary | ICD-10-CM

## 2021-05-19 DIAGNOSIS — L84 CORNS AND CALLUS: ICD-10-CM

## 2021-05-19 DIAGNOSIS — M77.42 METATARSALGIA OF BOTH FEET: Primary | ICD-10-CM

## 2021-05-19 PROBLEM — M79.672 PAIN IN BOTH FEET: Status: RESOLVED | Noted: 2021-04-16 | Resolved: 2021-05-19

## 2021-05-19 PROBLEM — M79.671 PAIN IN BOTH FEET: Status: RESOLVED | Noted: 2021-04-16 | Resolved: 2021-05-19

## 2021-05-19 PROCEDURE — 99213 OFFICE O/P EST LOW 20 MIN: CPT | Performed by: PODIATRIST

## 2021-05-19 NOTE — PROGRESS NOTES
Patient is here 1 month insoles. Patient states the insoles have helped. Patient bought sandals for at home.  Gurmeet Loredo MD 3/30/2021  Electronically signed by Zabrina Enrique LPN on 8/61/4690 at 3:58 AM

## 2021-05-19 NOTE — PROGRESS NOTES
21     Karolnce Salts    : 1965   Sex: male    Age: 48 y.o. Patient's PCP/Provider is:  Jennifer Sherwood MD    Subjective:  Patient is seen today for follow-up regarding continued evaluation regarding metatarsalgia issues bilateral foot. Has been wearing the good supportive sandals as recommended and his OTC insoles as well with noted improvement in both feet. Patient has minimal discomfort throughout the day now to the plantar aspect of both feet. No other additional abnormalities noted at this time. Chief Complaint   Patient presents with    Follow-up     insoles       ROS:  Const: Positives and pertinent negatives as per HPI. Musculo: Denies symptoms other than stated above. Neuro: Denies symptoms other than stated above. Skin: Denies symptoms other than stated above. Current Medications:    Current Outpatient Medications:     KLOR-CON M20 20 MEQ extended release tablet, TAKE 2 TABLETS DAILY, Disp: 180 tablet, Rfl: 3    hydroCHLOROthiazide (HYDRODIURIL) 25 MG tablet, TAKE 1 TABLET BY MOUTH EVERY DAY, Disp: 90 tablet, Rfl: 0    vitamin D (ERGOCALCIFEROL) 1.25 MG (62767 UT) CAPS capsule, TAKE 1 CAPSULE TWICE WEEKLY, Disp: 24 capsule, Rfl: 1    amLODIPine (NORVASC) 10 MG tablet, TAKE 1 TABLET DAILY, Disp: 90 tablet, Rfl: 3    olmesartan (BENICAR) 40 MG tablet, TAKE 1 TABLET DAILY, Disp: 90 tablet, Rfl: 3    potassium chloride (KLOR-CON M20) 20 MEQ extended release tablet, TAKE 2 TABLETS DAILY, Disp: 180 tablet, Rfl: 3    aspirin 81 MG chewable tablet, Take 1 tablet by mouth daily. (Patient taking differently: Take 81 mg by mouth daily Prescribed per PCP; states instructed to hold 5 days preop per Dr Parker Rosales), Disp: 30 tablet, Rfl: 3    Allergies:  No Known Allergies    Vitals:    21 0806   Weight: 266 lb (120.7 kg)   Height: 5' 9\" (1.753 m)       Exam:  Neurovascular status unchanged. Callused areas are improved plantar ball the foot regions bilaterally. Minimal tenderness noted into the plantar ball the foot region with active range of motion lesser digits bilateral foot. Improved gait noted upon evaluation. Diagnostic Studies:     No results found. Procedures:    None    Plan Per Assessment  Kris Pringle was seen today for follow-up. Diagnoses and all orders for this visit:    Metatarsalgia of both feet    Corns and callus      1. Evaluation and management  2. We did discuss continued use of the orthopedic sandals and OTC insoles with all additional shoe gear to prevent reoccurrence of symptoms. 3. He was advised continued use of the topical moisturizing cream to the callused areas for continued improvement. 4. Patient was advised to call the office with any questions or concerns in the interim if any additional Podiatric issues arise. Seen By:    Sarajane Prader, DPM    Electronically signed by Sarajane Prader, DPM on 5/19/2021 at 4:22 PM    This note was created using voice recognition software. The note was reviewed however may contain grammatical errors.

## 2021-06-17 ENCOUNTER — OFFICE VISIT (OUTPATIENT)
Dept: CARDIOLOGY CLINIC | Age: 56
End: 2021-06-17
Payer: COMMERCIAL

## 2021-06-17 VITALS
WEIGHT: 263.4 LBS | BODY MASS INDEX: 39.01 KG/M2 | RESPIRATION RATE: 16 BRPM | HEIGHT: 69 IN | DIASTOLIC BLOOD PRESSURE: 78 MMHG | SYSTOLIC BLOOD PRESSURE: 136 MMHG | HEART RATE: 69 BPM

## 2021-06-17 DIAGNOSIS — I49.3 PVC (PREMATURE VENTRICULAR CONTRACTION): ICD-10-CM

## 2021-06-17 DIAGNOSIS — I10 ESSENTIAL HYPERTENSION: ICD-10-CM

## 2021-06-17 DIAGNOSIS — Q23.1 BICUSPID AORTIC VALVE: Primary | ICD-10-CM

## 2021-06-17 DIAGNOSIS — G47.33 OSA (OBSTRUCTIVE SLEEP APNEA): ICD-10-CM

## 2021-06-17 PROCEDURE — 93000 ELECTROCARDIOGRAM COMPLETE: CPT | Performed by: INTERNAL MEDICINE

## 2021-06-17 PROCEDURE — 99244 OFF/OP CNSLTJ NEW/EST MOD 40: CPT | Performed by: INTERNAL MEDICINE

## 2021-06-18 NOTE — PROGRESS NOTES
OUTPATIENT CARDIOLOGY CONSULT    Name: Edgardo Pickett    Age: 48 y.o. Date of Service: 6/17/21    Reason for Consultation: Bicuspid aortic valve    Referring Physician: Heriberto Gil MD    History of Present Illness:  Edgardo Pickett is a 48 y.o. male who presents today for further evaluation of bicuspid aortic valve. His PMH is outlined in detail below (see A/P below). He is typically active with no complaints of chest pain, SOB, lightheadedness, dizziness, or syncope. No history of PND or orthopnea. +rare palpitations (improved recently). He is currently with no active cardiac complaints at rest. SR on EKG. He is a . He has a 5year old daughter.     Review of Systems:  Cardiac: As per HPI  General: No fever, chills  Pulmonary: As per HPI  HEENT: No visual disturbances, difficult swallowing  GI: No nausea, vomiting  : No dysuria, hematuria  Endocrine: No thyroid disease or DM  Musculoskeletal: BENÍTEZ x 4, no focal motor deficits  Skin: Intact, no rashes  Neuro: No headache, seizures  Psych: Currently with no depression, anxiety    Past Medical History:  Past Medical History:   Diagnosis Date    Encounter for screening colonoscopy 01/2018    Hepatitis     Hepatitis C antibody    Hypertension     Pain in both feet 4/16/2021    Sleep apnea with use of continuous positive airway pressure (CPAP)        Past Surgical History:  Past Surgical History:   Procedure Laterality Date    COLONOSCOPY  01/22/2018    ELBOW SURGERY  2007    fall    FOOT SURGERY  2007    fall    WRIST SURGERY  2007    fall from ladder       Family History:  Family History   Problem Relation Age of Onset    Cancer Mother         bladder x6    Glaucoma Mother     High Blood Pressure Mother     High Blood Pressure Father     Heart Disease Father 66        MI    Stroke Father 68    High Blood Pressure Paternal Grandmother     Heart Disease Paternal Grandmother     Other Maternal Cousin         seizures    Heart Disease Paternal Aunt     Heart Disease Paternal Grandfather     Heart Disease Paternal Cousin      Father -- CAD/MI in 2014 at age ~ 66 (currently age 80)    Social History:  Social History     Socioeconomic History    Marital status:      Spouse name: Not on file    Number of children: Not on file    Years of education: Not on file    Highest education level: Not on file   Occupational History    Not on file   Tobacco Use    Smoking status: Never Smoker    Smokeless tobacco: Never Used   Vaping Use    Vaping Use: Never used   Substance and Sexual Activity    Alcohol use: Yes     Alcohol/week: 3.0 standard drinks     Types: 3 Cans of beer per week     Comment: 2-3 beer per weekly    Drug use: No    Sexual activity: Not on file   Other Topics Concern    Not on file   Social History Narrative    Not on file     Social Determinants of Health     Financial Resource Strain: Low Risk     Difficulty of Paying Living Expenses: Not hard at all   Food Insecurity: No Food Insecurity    Worried About Running Out of Food in the Last Year: Never true    [address] in the Last Year: Never true   Transportation Needs: No Transportation Needs    Lack of Transportation (Medical): No    Lack of Transportation (Non-Medical): No   Physical Activity:     Days of Exercise per Week:     Minutes of Exercise per Session:    Stress:     Feeling of Stress :    Social Connections:     Frequency of Communication with Friends and Family:     Frequency of Social Gatherings with Friends and Family:     Attends Caoda[employer] Services:     Active Member of Clubs or Organizations:     Attends Club or Organization Meetings:     Marital Status:    Intimate Partner Violence:     Fear of Current or Ex-Partner:     Emotionally Abused:     Physically Abused:     Sexually Abused:         Allergies:  No Known Allergies    Current Medications:  Current Outpatient Medications   Medication Sig Dispense Refill    KLOR-CON M20 20 MEQ extended release tablet TAKE 2 TABLETS DAILY 180 tablet 3    hydroCHLOROthiazide (HYDRODIURIL) 25 MG tablet TAKE 1 TABLET BY MOUTH EVERY DAY 90 tablet 0    vitamin D (ERGOCALCIFEROL) 1.25 MG (14775 UT) CAPS capsule TAKE 1 CAPSULE TWICE WEEKLY 24 capsule 1    amLODIPine (NORVASC) 10 MG tablet TAKE 1 TABLET DAILY 90 tablet 3    olmesartan (BENICAR) 40 MG tablet TAKE 1 TABLET DAILY 90 tablet 3    potassium chloride (KLOR-CON M20) 20 MEQ extended release tablet TAKE 2 TABLETS DAILY 180 tablet 3    aspirin 81 MG chewable tablet Take 1 tablet by mouth daily. (Patient taking differently: Take 81 mg by mouth daily Prescribed per PCP; states instructed to hold 5 days preop per Dr Tawanna Galicia) 30 tablet 3     No current facility-administered medications for this visit. Physical Exam:  /78   Pulse 69   Resp 16   Ht 5' 9\" (1.753 m)   Wt 263 lb 6.4 oz (119.5 kg)   BMI 38.90 kg/m²   Wt Readings from Last 3 Encounters:   06/17/21 263 lb 6.4 oz (119.5 kg)   05/19/21 266 lb (120.7 kg)   04/16/21 266 lb (120.7 kg)     Appearance: Awake, alert, no acute respiratory distress  Skin: Intact, no rash  Head: Normocephalic, atraumatic  Eyes: EOMI, no conjunctival erythema  ENMT: No pharyngeal erythema, MMM, no rhinorrhea  Neck: Supple, no elevated JVP, no carotid bruits  Lungs: Clear to auscultation bilaterally. No wheezes, rales, or rhonchi.   Cardiac: Regular rate and rhythm, 2/6 JAMARCUS > RUSB  Abdomen: Soft, nontender, +bowel sounds  Extremities: Moves all extremities x 4, no lower extremity edema  Neurologic: No focal motor deficits apparent, normal mood and affect    Laboratory Tests:  Lab Results   Component Value Date    CREATININE 0.8 12/14/2020    BUN 10 12/14/2020     12/14/2020    K 3.4 (L) 12/14/2020     12/14/2020    CO2 24 12/14/2020     Lab Results   Component Value Date    MG 2.2 08/18/2014     Lab Results   Component Value Date    WBC 8.1 12/14/2020    HGB 15.7 12/14/2020    HCT 47.7 12/14/2020    MCV 91.7 12/14/2020     12/14/2020     Lab Results   Component Value Date    ALT 39 12/14/2020    AST 31 12/14/2020    ALKPHOS 67 12/14/2020    BILITOT 0.6 12/14/2020     Lab Results   Component Value Date    CKTOTAL 81 08/18/2014    CKMB 2.5 08/18/2014    TROPONINI <0.01 08/18/2014    TROPONINI <0.01 08/17/2014    TROPONINI <0.01 08/17/2014     No results found for: INR, PROTIME  Lab Results   Component Value Date    TSH 1.370 10/22/2015     Lab Results   Component Value Date    LABA1C 6.4 (H) 12/14/2020     No results found for: EAG  Lab Results   Component Value Date    CHOL 173 12/14/2020    CHOL 191 11/20/2019    CHOL 180 01/25/2019     Lab Results   Component Value Date    TRIG 70 12/14/2020    TRIG 119 11/20/2019    TRIG 77 01/25/2019     Lab Results   Component Value Date    HDL 50 12/14/2020    HDL 52 11/20/2019    HDL 45 01/25/2019     Lab Results   Component Value Date    LDLCALC 109 (H) 12/14/2020    LDLCALC 115 (H) 11/20/2019    LDLCALC 120 (H) 01/25/2019     Lab Results   Component Value Date    LABVLDL 14 12/14/2020    LABVLDL 24 11/20/2019    LABVLDL 15 01/25/2019     No results found for: CHOLHDLRATIO  No results for input(s): PROBNP in the last 72 hours. Cardiac Tests:  EKG reviewed (EKG date: 6/17/21): SR, rate 69, RBBB, NSSTT changes    Echocardiogram: 3/29/21 (Dr. Cece Mendes)   Bicuspid or Unicuspid Aortic Valve   Mild Aortic stenosis. No AI. Normal left ventricle size. Mild concentric left ventricular hypertrophy. Ejection fraction is visually estimated at 55-60%. Exercise nuclear stress test:  3/29/21 (Dr. Cece Mendes)  The patient exercised using a Neymar protocol,   completing 9:00 minutes and reaching an estimated work load of   48.2 metabolic equivalents (METS). Resting HR was 67. Peak   exercise heart rate was 148 ( 89% of maximum predicted heart rate   for age). Baseline /80. Peak exercise /78. 1. Exercise EKG was negative.    2. The

## 2021-08-16 RX ORDER — HYDROCHLOROTHIAZIDE 25 MG/1
TABLET ORAL
Qty: 90 TABLET | Refills: 0 | Status: SHIPPED
Start: 2021-08-16 | End: 2021-12-30 | Stop reason: SDUPTHER

## 2021-08-20 DIAGNOSIS — I10 ESSENTIAL HYPERTENSION: Primary | ICD-10-CM

## 2021-08-20 DIAGNOSIS — R73.9 HYPERGLYCEMIA: ICD-10-CM

## 2021-09-13 RX ORDER — ERGOCALCIFEROL 1.25 MG/1
CAPSULE ORAL
Qty: 24 CAPSULE | Refills: 1 | Status: SHIPPED
Start: 2021-09-13 | End: 2022-02-28

## 2021-10-15 DIAGNOSIS — I10 ESSENTIAL HYPERTENSION: ICD-10-CM

## 2021-10-15 DIAGNOSIS — R73.9 HYPERGLYCEMIA: ICD-10-CM

## 2021-10-15 LAB
ALBUMIN SERPL-MCNC: 4.4 G/DL (ref 3.5–5.2)
ALP BLD-CCNC: 90 U/L (ref 40–129)
ALT SERPL-CCNC: 66 U/L (ref 0–40)
ANION GAP SERPL CALCULATED.3IONS-SCNC: 19 MMOL/L (ref 7–16)
AST SERPL-CCNC: 36 U/L (ref 0–39)
BASOPHILS ABSOLUTE: 0.03 E9/L (ref 0–0.2)
BASOPHILS RELATIVE PERCENT: 0.4 % (ref 0–2)
BILIRUB SERPL-MCNC: 0.7 MG/DL (ref 0–1.2)
BUN BLDV-MCNC: 13 MG/DL (ref 6–20)
CALCIUM SERPL-MCNC: 9.3 MG/DL (ref 8.6–10.2)
CHLORIDE BLD-SCNC: 101 MMOL/L (ref 98–107)
CHOLESTEROL, TOTAL: 202 MG/DL (ref 0–199)
CO2: 20 MMOL/L (ref 22–29)
CREAT SERPL-MCNC: 0.8 MG/DL (ref 0.7–1.2)
EOSINOPHILS ABSOLUTE: 0.14 E9/L (ref 0.05–0.5)
EOSINOPHILS RELATIVE PERCENT: 1.9 % (ref 0–6)
GFR AFRICAN AMERICAN: >60
GFR NON-AFRICAN AMERICAN: >60 ML/MIN/1.73
GLUCOSE BLD-MCNC: 297 MG/DL (ref 74–99)
HBA1C MFR BLD: 9.5 % (ref 4–5.6)
HCT VFR BLD CALC: 43.2 % (ref 37–54)
HDLC SERPL-MCNC: 46 MG/DL
HEMOGLOBIN: 15.9 G/DL (ref 12.5–16.5)
IMMATURE GRANULOCYTES #: 0.04 E9/L
IMMATURE GRANULOCYTES %: 0.6 % (ref 0–5)
LDL CHOLESTEROL CALCULATED: 130 MG/DL (ref 0–99)
LYMPHOCYTES ABSOLUTE: 1.56 E9/L (ref 1.5–4)
LYMPHOCYTES RELATIVE PERCENT: 21.6 % (ref 20–42)
MCH RBC QN AUTO: 30.5 PG (ref 26–35)
MCHC RBC AUTO-ENTMCNC: 36.8 % (ref 32–34.5)
MCV RBC AUTO: 82.8 FL (ref 80–99.9)
MONOCYTES ABSOLUTE: 0.6 E9/L (ref 0.1–0.95)
MONOCYTES RELATIVE PERCENT: 8.3 % (ref 2–12)
NEUTROPHILS ABSOLUTE: 4.84 E9/L (ref 1.8–7.3)
NEUTROPHILS RELATIVE PERCENT: 67.2 % (ref 43–80)
PDW BLD-RTO: 12.5 FL (ref 11.5–15)
PLATELET # BLD: 216 E9/L (ref 130–450)
PMV BLD AUTO: 10.5 FL (ref 7–12)
POTASSIUM SERPL-SCNC: 3.6 MMOL/L (ref 3.5–5)
RBC # BLD: 5.22 E12/L (ref 3.8–5.8)
SODIUM BLD-SCNC: 140 MMOL/L (ref 132–146)
TOTAL PROTEIN: 6.3 G/DL (ref 6.4–8.3)
TRIGL SERPL-MCNC: 129 MG/DL (ref 0–149)
VLDLC SERPL CALC-MCNC: 26 MG/DL
WBC # BLD: 7.2 E9/L (ref 4.5–11.5)

## 2021-11-04 ENCOUNTER — OFFICE VISIT (OUTPATIENT)
Dept: FAMILY MEDICINE CLINIC | Age: 56
End: 2021-11-04
Payer: COMMERCIAL

## 2021-11-04 VITALS
BODY MASS INDEX: 37.45 KG/M2 | SYSTOLIC BLOOD PRESSURE: 139 MMHG | RESPIRATION RATE: 16 BRPM | HEART RATE: 68 BPM | WEIGHT: 253.6 LBS | DIASTOLIC BLOOD PRESSURE: 88 MMHG | OXYGEN SATURATION: 100 % | TEMPERATURE: 97.7 F

## 2021-11-04 DIAGNOSIS — I10 ESSENTIAL HYPERTENSION: ICD-10-CM

## 2021-11-04 DIAGNOSIS — Z00.00 ANNUAL PHYSICAL EXAM: Primary | ICD-10-CM

## 2021-11-04 DIAGNOSIS — E11.65 UNCONTROLLED TYPE 2 DIABETES MELLITUS WITH HYPERGLYCEMIA (HCC): ICD-10-CM

## 2021-11-04 DIAGNOSIS — Z23 NEED FOR INFLUENZA VACCINATION: ICD-10-CM

## 2021-11-04 DIAGNOSIS — R73.9 HYPERGLYCEMIA: ICD-10-CM

## 2021-11-04 LAB
CREATININE URINE POCT: NORMAL
MICROALBUMIN/CREAT 24H UR: NORMAL MG/G{CREAT}
MICROALBUMIN/CREAT UR-RTO: NORMAL

## 2021-11-04 PROCEDURE — 82044 UR ALBUMIN SEMIQUANTITATIVE: CPT | Performed by: FAMILY MEDICINE

## 2021-11-04 PROCEDURE — 90471 IMMUNIZATION ADMIN: CPT | Performed by: FAMILY MEDICINE

## 2021-11-04 PROCEDURE — 99396 PREV VISIT EST AGE 40-64: CPT | Performed by: FAMILY MEDICINE

## 2021-11-04 PROCEDURE — 90674 CCIIV4 VAC NO PRSV 0.5 ML IM: CPT | Performed by: FAMILY MEDICINE

## 2021-11-04 RX ORDER — GLUCOSAMINE HCL/CHONDROITIN SU 500-400 MG
CAPSULE ORAL
Qty: 100 STRIP | Refills: 0 | Status: SHIPPED
Start: 2021-11-04 | End: 2022-02-07 | Stop reason: SDUPTHER

## 2021-11-04 RX ORDER — ORAL SEMAGLUTIDE 3 MG/1
1 TABLET ORAL DAILY
Qty: 30 TABLET | Refills: 0 | COMMUNITY
Start: 2021-11-04 | End: 2021-12-09

## 2021-11-04 NOTE — PROGRESS NOTES
Sexual activity: Not on file   Other Topics Concern    Not on file   Social History Narrative    Not on file     Social Determinants of Health     Financial Resource Strain: Low Risk     Difficulty of Paying Living Expenses: Not hard at all   Food Insecurity: No Food Insecurity    Worried About Running Out of Food in the Last Year: Never true    [address] in the Last Year: Never true   Transportation Needs: No Transportation Needs    Lack of Transportation (Medical): No    Lack of Transportation (Non-Medical):  No   Physical Activity:     Days of Exercise per Week: Not on file    Minutes of Exercise per Session: Not on file   Stress:     Feeling of Stress : Not on file   Social Connections:     Frequency of Communication with Friends and Family: Not on file    Frequency of Social Gatherings with Friends and Family: Not on file    Attends Mor[employer] Services: Not on file    Active Member of [address] [place] [address] or Organizations: Not on file    Attends Club or Organization Meetings: Not on file    Marital Status: Not on file   Intimate Partner Violence:     Fear of Current or Ex-Partner: Not on file    Emotionally Abused: Not on file    Physically Abused: Not on file    Sexually Abused: Not on file   Housing Stability:     Unable to Pay for Housing in the Last Year: Not on file    Number of [place] in the Last Year: Not on file    Unstable Housing in the Last Year: Not on file      No Known Allergies     Review of Systems:  Constitutional:  No fever, no fatigue, no chills, no headaches, no weight change  Dermatology:  No rash, no mole, no dry or sensitive skin  ENT:  No cough, no sore throat, no sinus pain, no runny nose, no ear pain  Cardiology:  No chest pain, no palpitations, no leg edema, no shortness of breath, no PND  Endocrinology:  No polydipsia, no polyuria, no cold intolerance, no heat intolerance, no polyphagia, no hair changes  Gastroenterology:  No dysphagia, no abdominal pain, no nausea, irregular blood glucose. Dispense sufficient amount for indicated testing frequency plus additional to accommodate PRN testing needs. -     blood glucose monitor kit and supplies; Dx: DM2      As above. Call or go to ED immediately if symptoms worsen or persist.  Return in about 4 weeks (around 12/2/2021). or sooner if necessary. Educational materials and/or home exercises printed for patient's review and were included in patient instructions on his/her After Visit Summary and given to patient at the end of visit. Counseled regarding above diagnosis, including possible risks and complications,  especially if left uncontrolled. Counseled regarding the possible side effects, risks, benefits and alternatives to treatment; patient and/or guardian verbalizes understanding, agrees, feels comfortable with and wishes to proceed with above treatment plan. Advised patient to call with any new medication issues, and read all Rx info from pharmacy to assure aware of all possible risks and side effects of medication before taking. Reviewed age and gender appropriate health screening exams and vaccinations. Advised patient regarding importance of keeping up with recommended health maintenance and to schedule as soon as possible if overdue, as this is important in assessing for undiagnosed pathology, especially cancer, as well as protecting against potentially harmful/life threatening disease. Patient and/or guardian verbalizes understanding and agrees with above counseling, assessment and plan. All questions answered. Clarice Cornejo MD  11/8/2021    I have personally reviewed and updated the chief complaint, HPI, Past Medical, Family and Social History, as well as the above Review of Systems.

## 2021-12-09 ENCOUNTER — OFFICE VISIT (OUTPATIENT)
Dept: FAMILY MEDICINE CLINIC | Age: 56
End: 2021-12-09
Payer: COMMERCIAL

## 2021-12-09 VITALS
HEART RATE: 75 BPM | HEIGHT: 69 IN | TEMPERATURE: 97.7 F | SYSTOLIC BLOOD PRESSURE: 133 MMHG | BODY MASS INDEX: 37.28 KG/M2 | RESPIRATION RATE: 16 BRPM | WEIGHT: 251.7 LBS | DIASTOLIC BLOOD PRESSURE: 93 MMHG | OXYGEN SATURATION: 99 %

## 2021-12-09 DIAGNOSIS — I10 ESSENTIAL HYPERTENSION: ICD-10-CM

## 2021-12-09 DIAGNOSIS — E11.65 UNCONTROLLED TYPE 2 DIABETES MELLITUS WITH HYPERGLYCEMIA (HCC): Primary | ICD-10-CM

## 2021-12-09 PROCEDURE — 99214 OFFICE O/P EST MOD 30 MIN: CPT | Performed by: FAMILY MEDICINE

## 2021-12-09 RX ORDER — ORAL SEMAGLUTIDE 7 MG/1
1 TABLET ORAL DAILY
Qty: 30 TABLET | Refills: 3 | Status: SHIPPED
Start: 2021-12-09 | End: 2022-02-11 | Stop reason: SDUPTHER

## 2021-12-09 RX ORDER — LANCETS 30 GAUGE
1 EACH MISCELLANEOUS DAILY
Qty: 100 EACH | Refills: 3 | Status: SHIPPED | OUTPATIENT
Start: 2021-12-09

## 2021-12-09 NOTE — PROGRESS NOTES
DM2:   Patient is here to fu regarding DM2. Patient is not controlled. Taking all medications and tolerating well. Fasting sugars are running 120-150. Patient is taking ASA and Ace Inhibitor/ARB. Patient is  on appropriately-dosed statin. LDL is not at goal.  BP is  controlled. No hypoglycemic episodes. Patient does not see Podiatry regularly. Saw an Eye Dr 2020. Patient is aware that it is necessary to see an Eye Dr yearly. Patient does not smoke. Most recent labs reviewed with patient. Patient does not have complaints or concerns today. Changed diet lost 5 lbs and is doing well. Lab Results   Component Value Date    LABA1C 9.5 (H) 10/15/2021       Lab Results   Component Value Date    LDLCALC 130 (H) 10/15/2021        Patient's past medical, surgical, social and/or family history reviewed, updated in chart, and are non-contributory (unless otherwise stated). Medications and allergies also reviewed and updated in chart.       Review of Systems:  Constitutional:  No fever, no fatigue, no chills, no headaches, no weight change  Dermatology:  No rash, no mole, no dry or sensitive skin  ENT:  No cough, no sore throat, no sinus pain, no runny nose, no ear pain  Cardiology:  No chest pain, no palpitations, no leg edema, no shortness of breath, no PND  Gastroenterology:  No dysphagia, no abdominal pain, no nausea, no vomiting, no constipation, no diarrhea, no heartburn  Musculoskeletal:  No joint pain, no leg cramps, no back pain, no muscle aches  Respiratory:  No shortness of breath, no orthopnea, no wheezing, no GORMAN, no hemoptysis  Urology:  No blood in the urine, no urinary frequency, no urinary incontinence, no urinary urgency, no nocturia, no dysuria  Vitals:    12/09/21 1428 12/09/21 1436   BP: (!) 130/92 (!) 133/93   Pulse: 75    Resp: 16    Temp: 97.7 °F (36.5 °C)    TempSrc: Temporal    SpO2: 99%    Weight: 251 lb 11.2 oz (114.2 kg)    Height: 5' 9\" (1.753 m)        General:  Patient alert and oriented x 3, NAD, pleasant  HEENT:  Atraumatic, normocephalic, PERRLA, EOMI, clear conjunctiva, TMs clear, nose-clear, throat - no erythema  Neck:  Supple, no goiter, no carotid bruits, no lymphadenopathy  Lungs:  CTA B  Heart:  RRR, no murmurs, gallops or rubs  Abdomen:  Soft/nt/nd, + bowel sounds  Extremities:  No clubbing, cyanosis or edema  Skin: unremarkable    Assessment/Plan:      Jovanny Kelsey was seen today for diabetes. Diagnoses and all orders for this visit:    Uncontrolled type 2 diabetes mellitus with hyperglycemia ([facility] [address]  -     Comprehensive Metabolic Panel; Future  -     Hemoglobin A1C; Future  -     Lipid Panel; Future    Essential hypertension    Other orders  -     Semaglutide (RYBELSUS) 7 MG TABS; Take 1 tablet by mouth daily  -     Lancets MISC; 1 each by Does not apply route daily      As above. Call or go to ED immediately if symptoms worsen or persist.  No follow-ups on file. , or sooner if necessary. Educational materials and/or home exercises printed for patient's review and were included in patient instructions on his/her After Visit Summary and given to patient at the end of visit. Counseled regarding above diagnosis, including possible risks and complications,  especially if left uncontrolled. Counseled regarding the possible side effects, risks, benefits and alternatives to treatment; patient and/or guardian verbalizes understanding, agrees, feels comfortable with and wishes to proceed with above treatment plan. Advised patient to call with any new medication issues, and read all Rx info from pharmacy to assure aware of all possible risks and side effects of medication before taking. Reviewed age and gender appropriate health screening exams and vaccinations.   Advised patient regarding importance of keeping up with recommended health maintenance and to schedule as soon as possible if overdue, as this is important in assessing for undiagnosed pathology, especially cancer, as well as protecting against potentially harmful/life threatening disease. Patient and/or guardian verbalizes understanding and agrees with above counseling, assessment and plan. All questions answered. Enrrique Harden MD  12/10/2021    I have personally reviewed and updated the chief complaint, HPI, Past Medical, Family and Social History, as well as the above Review of Systems.

## 2021-12-10 RX ORDER — AMLODIPINE BESYLATE 10 MG/1
TABLET ORAL
Qty: 90 TABLET | Refills: 3 | Status: SHIPPED | OUTPATIENT
Start: 2021-12-10

## 2021-12-10 RX ORDER — OLMESARTAN MEDOXOMIL 40 MG/1
TABLET ORAL
Qty: 90 TABLET | Refills: 3 | Status: SHIPPED | OUTPATIENT
Start: 2021-12-10

## 2021-12-30 DIAGNOSIS — I10 ESSENTIAL HYPERTENSION: Primary | ICD-10-CM

## 2021-12-30 RX ORDER — HYDROCHLOROTHIAZIDE 25 MG/1
TABLET ORAL
Qty: 90 TABLET | Refills: 0 | Status: SHIPPED
Start: 2021-12-30 | End: 2022-02-07 | Stop reason: SDUPTHER

## 2022-02-07 ENCOUNTER — PATIENT MESSAGE (OUTPATIENT)
Dept: FAMILY MEDICINE CLINIC | Age: 57
End: 2022-02-07

## 2022-02-07 DIAGNOSIS — I10 ESSENTIAL HYPERTENSION: ICD-10-CM

## 2022-02-07 RX ORDER — GLUCOSAMINE HCL/CHONDROITIN SU 500-400 MG
CAPSULE ORAL
Qty: 100 STRIP | Refills: 3 | Status: SHIPPED
Start: 2022-02-07 | End: 2022-02-07 | Stop reason: SDUPTHER

## 2022-02-07 RX ORDER — GLUCOSAMINE HCL/CHONDROITIN SU 500-400 MG
CAPSULE ORAL
Qty: 100 STRIP | Refills: 3 | Status: SHIPPED | OUTPATIENT
Start: 2022-02-07

## 2022-02-07 RX ORDER — HYDROCHLOROTHIAZIDE 25 MG/1
TABLET ORAL
Qty: 90 TABLET | Refills: 0 | Status: SHIPPED
Start: 2022-02-07 | End: 2022-02-11 | Stop reason: SDUPTHER

## 2022-02-07 NOTE — TELEPHONE ENCOUNTER
From: Gabino Mckeon  To: Dr. Keysha Bynum: 2/7/2022 11:54 AM EST  Subject: Refill Needed for OneTouch Ultra Test Strips     I need a refill for [id number] - OneTouch Ultra Test Strips - as soon as possible since I only have 3 left. Please send it to the [facility] pharmacy in [facility], [place] (corner of [address]). Also, I have an appointment with Dr. Selestine Kehr on Friday 02/11/2022 at 1:00 pm. Do I need to have blood work done for that appointment? I don't have an appointment for the blood work so I guess I don't need it, but please confirm it. If I do need to have blood work done please let me know and I can try to have it done tomorrow or Wednesday, so that we have test results before the appointment with Dr. Selestine Kehr.     Many thanks,  Josie Garcia

## 2022-02-08 DIAGNOSIS — E11.65 UNCONTROLLED TYPE 2 DIABETES MELLITUS WITH HYPERGLYCEMIA (HCC): ICD-10-CM

## 2022-02-08 LAB
ALBUMIN SERPL-MCNC: 4.5 G/DL (ref 3.5–5.2)
ALP BLD-CCNC: 70 U/L (ref 40–129)
ALT SERPL-CCNC: 29 U/L (ref 0–40)
ANION GAP SERPL CALCULATED.3IONS-SCNC: 15 MMOL/L (ref 7–16)
AST SERPL-CCNC: 23 U/L (ref 0–39)
BILIRUB SERPL-MCNC: 0.5 MG/DL (ref 0–1.2)
BUN BLDV-MCNC: 19 MG/DL (ref 6–20)
CALCIUM SERPL-MCNC: 9.6 MG/DL (ref 8.6–10.2)
CHLORIDE BLD-SCNC: 104 MMOL/L (ref 98–107)
CHOLESTEROL, TOTAL: 167 MG/DL (ref 0–199)
CO2: 24 MMOL/L (ref 22–29)
CREAT SERPL-MCNC: 1 MG/DL (ref 0.7–1.2)
GFR AFRICAN AMERICAN: >60
GFR NON-AFRICAN AMERICAN: >60 ML/MIN/1.73
GLUCOSE BLD-MCNC: 114 MG/DL (ref 74–99)
HBA1C MFR BLD: 5.3 % (ref 4–5.6)
HDLC SERPL-MCNC: 49 MG/DL
LDL CHOLESTEROL CALCULATED: 101 MG/DL (ref 0–99)
POTASSIUM SERPL-SCNC: 4.1 MMOL/L (ref 3.5–5)
SODIUM BLD-SCNC: 143 MMOL/L (ref 132–146)
TOTAL PROTEIN: 7.2 G/DL (ref 6.4–8.3)
TRIGL SERPL-MCNC: 85 MG/DL (ref 0–149)
VLDLC SERPL CALC-MCNC: 17 MG/DL

## 2022-02-11 ENCOUNTER — OFFICE VISIT (OUTPATIENT)
Dept: FAMILY MEDICINE CLINIC | Age: 57
End: 2022-02-11
Payer: COMMERCIAL

## 2022-02-11 VITALS
BODY MASS INDEX: 36.45 KG/M2 | HEIGHT: 69 IN | OXYGEN SATURATION: 97 % | DIASTOLIC BLOOD PRESSURE: 88 MMHG | SYSTOLIC BLOOD PRESSURE: 122 MMHG | TEMPERATURE: 98 F | HEART RATE: 77 BPM | RESPIRATION RATE: 16 BRPM | WEIGHT: 246.1 LBS

## 2022-02-11 DIAGNOSIS — E11.9 CONTROLLED TYPE 2 DIABETES MELLITUS WITHOUT COMPLICATION, WITHOUT LONG-TERM CURRENT USE OF INSULIN (HCC): Primary | ICD-10-CM

## 2022-02-11 DIAGNOSIS — I10 ESSENTIAL HYPERTENSION: ICD-10-CM

## 2022-02-11 PROCEDURE — 99214 OFFICE O/P EST MOD 30 MIN: CPT | Performed by: FAMILY MEDICINE

## 2022-02-11 RX ORDER — HYDROCHLOROTHIAZIDE 25 MG/1
TABLET ORAL
Qty: 90 TABLET | Refills: 3 | Status: SHIPPED
Start: 2022-02-11 | End: 2022-06-16

## 2022-02-11 RX ORDER — ORAL SEMAGLUTIDE 7 MG/1
1 TABLET ORAL DAILY
Qty: 90 TABLET | Refills: 3 | Status: SHIPPED
Start: 2022-02-11 | End: 2022-03-08 | Stop reason: SDUPTHER

## 2022-02-11 ASSESSMENT — PATIENT HEALTH QUESTIONNAIRE - PHQ9
2. FEELING DOWN, DEPRESSED OR HOPELESS: 0
SUM OF ALL RESPONSES TO PHQ QUESTIONS 1-9: 0
SUM OF ALL RESPONSES TO PHQ QUESTIONS 1-9: 0
1. LITTLE INTEREST OR PLEASURE IN DOING THINGS: 0
SUM OF ALL RESPONSES TO PHQ QUESTIONS 1-9: 0
SUM OF ALL RESPONSES TO PHQ9 QUESTIONS 1 & 2: 0
SUM OF ALL RESPONSES TO PHQ QUESTIONS 1-9: 0

## 2022-02-11 ASSESSMENT — LIFESTYLE VARIABLES: HOW OFTEN DO YOU HAVE A DRINK CONTAINING ALCOHOL: NEVER

## 2022-02-11 NOTE — PROGRESS NOTES
DM2:   Patient is here to fu regarding DM2. Patient is  controlled. Taking all medications and tolerating well. Fasting sugars are running . Patient is taking ASA and Ace Inhibitor/ARB. Patient is  on appropriately-dosed statin. LDL is  at goal.  BP is  controlled. No hypoglycemic episodes. Patient does not see Podiatry regularly. Saw an Eye Dr 2021. Patient is aware that it is necessary to see an Eye Dr yearly. Patient does not smoke. Most recent labs reviewed with patient. Patient does have complaints or concerns today. Lab Results   Component Value Date    LABA1C 5.3 02/08/2022       Lab Results   Component Value Date    LDLCALC 101 (H) 02/08/2022        Patient's past medical, surgical, social and/or family history reviewed, updated in chart, and are non-contributory (unless otherwise stated). Medications and allergies also reviewed and updated in chart.       Review of Systems:  Constitutional:  No fever, no fatigue, no chills, no headaches, no weight change  Dermatology:  No rash, no mole, no dry or sensitive skin  ENT:  No cough, no sore throat, no sinus pain, no runny nose, no ear pain  Cardiology:  No chest pain, no palpitations, no leg edema, no shortness of breath, no PND  Gastroenterology:  No dysphagia, no abdominal pain, no nausea, no vomiting, no constipation, no diarrhea, no heartburn  Musculoskeletal:  No joint pain, no leg cramps, no back pain, no muscle aches  Respiratory:  No shortness of breath, no orthopnea, no wheezing, no GORMAN, no hemoptysis  Urology:  No blood in the urine, no urinary frequency, no urinary incontinence, no urinary urgency, no nocturia, no dysuria  Vitals:    02/11/22 1322 02/11/22 1330   BP: (!) 139/91 122/88   Pulse: 77    Resp: 16    Temp: 98 °F (36.7 °C)    TempSrc: Temporal    SpO2: 97%    Weight: 246 lb 1.6 oz (111.6 kg)    Height: 5' 9\" (1.753 m)        General:  Patient alert and oriented x 3, NAD, pleasant  HEENT:  Atraumatic, normocephalic, PERRLA, EOMI, clear conjunctiva, TMs clear, nose-clear, throat - no erythema  Neck:  Supple, no goiter, no carotid bruits, no lymphadenopathy  Lungs:  CTA B  Heart:  RRR, no murmurs, gallops or rubs  Abdomen:  Soft/nt/nd, + bowel sounds  Extremities:  No clubbing, cyanosis or edema  Skin: unremarkable    Assessment/Plan:      Marlene Britt was seen today for diabetes and discuss labs. Diagnoses and all orders for this visit:    Controlled type 2 diabetes mellitus without complication, without long-term current use of insulin ([facility]CC)  -     Hemoglobin A1C; Future  -     Comprehensive Metabolic Panel; Future  -     Lipid Panel; Future    Essential hypertension  -     hydroCHLOROthiazide (HYDRODIURIL) 25 MG tablet; TAKE 1 TABLET BY MOUTH EVERY DAY    Other orders  -     Semaglutide (RYBELSUS) 7 MG TABS; Take 1 tablet by mouth daily      As above. Call or go to ED immediately if symptoms worsen or persist.  Return in about 3 months (around 5/11/2022). , or sooner if necessary. Educational materials and/or home exercises printed for patient's review and were included in patient instructions on his/her After Visit Summary and given to patient at the end of visit. Counseled regarding above diagnosis, including possible risks and complications,  especially if left uncontrolled. Counseled regarding the possible side effects, risks, benefits and alternatives to treatment; patient and/or guardian verbalizes understanding, agrees, feels comfortable with and wishes to proceed with above treatment plan. Advised patient to call with any new medication issues, and read all Rx info from pharmacy to assure aware of all possible risks and side effects of medication before taking. Reviewed age and gender appropriate health screening exams and vaccinations.   Advised patient regarding importance of keeping up with recommended health maintenance and to schedule as soon as possible if overdue, as this is important in assessing for undiagnosed pathology, especially cancer, as well as protecting against potentially harmful/life threatening disease. Patient and/or guardian verbalizes understanding and agrees with above counseling, assessment and plan. All questions answered. Daryle Bali, MD  2/14/2022    I have personally reviewed and updated the chief complaint, HPI, Past Medical, Family and Social History, as well as the above Review of Systems.

## 2022-02-28 RX ORDER — ERGOCALCIFEROL 1.25 MG/1
CAPSULE ORAL
Qty: 24 CAPSULE | Refills: 1 | Status: SHIPPED
Start: 2022-02-28 | End: 2022-07-27

## 2022-03-08 RX ORDER — ORAL SEMAGLUTIDE 7 MG/1
1 TABLET ORAL DAILY
Qty: 90 TABLET | Refills: 0 | Status: SHIPPED
Start: 2022-03-08 | End: 2022-09-19 | Stop reason: SDUPTHER

## 2022-04-21 LAB — DIABETIC RETINOPATHY: NEGATIVE

## 2022-04-28 RX ORDER — POTASSIUM CHLORIDE 1500 MG/1
TABLET, EXTENDED RELEASE ORAL
Qty: 180 TABLET | Refills: 3 | Status: SHIPPED | OUTPATIENT
Start: 2022-04-28

## 2022-05-06 DIAGNOSIS — E11.9 CONTROLLED TYPE 2 DIABETES MELLITUS WITHOUT COMPLICATION, WITHOUT LONG-TERM CURRENT USE OF INSULIN (HCC): ICD-10-CM

## 2022-05-06 LAB
ALBUMIN SERPL-MCNC: 4.3 G/DL (ref 3.5–5.2)
ALP BLD-CCNC: 72 U/L (ref 40–129)
ALT SERPL-CCNC: 21 U/L (ref 0–40)
ANION GAP SERPL CALCULATED.3IONS-SCNC: 14 MMOL/L (ref 7–16)
AST SERPL-CCNC: 16 U/L (ref 0–39)
BILIRUB SERPL-MCNC: 0.6 MG/DL (ref 0–1.2)
BUN BLDV-MCNC: 17 MG/DL (ref 6–20)
CALCIUM SERPL-MCNC: 9.7 MG/DL (ref 8.6–10.2)
CHLORIDE BLD-SCNC: 105 MMOL/L (ref 98–107)
CHOLESTEROL, TOTAL: 169 MG/DL (ref 0–199)
CO2: 22 MMOL/L (ref 22–29)
CREAT SERPL-MCNC: 1 MG/DL (ref 0.7–1.2)
GFR AFRICAN AMERICAN: >60
GFR NON-AFRICAN AMERICAN: >60 ML/MIN/1.73
GLUCOSE BLD-MCNC: 105 MG/DL (ref 74–99)
HBA1C MFR BLD: 5.2 % (ref 4–5.6)
HDLC SERPL-MCNC: 54 MG/DL
LDL CHOLESTEROL CALCULATED: 105 MG/DL (ref 0–99)
POTASSIUM SERPL-SCNC: 3.7 MMOL/L (ref 3.5–5)
SODIUM BLD-SCNC: 141 MMOL/L (ref 132–146)
TOTAL PROTEIN: 7 G/DL (ref 6.4–8.3)
TRIGL SERPL-MCNC: 52 MG/DL (ref 0–149)
VLDLC SERPL CALC-MCNC: 10 MG/DL

## 2022-05-20 ENCOUNTER — OFFICE VISIT (OUTPATIENT)
Dept: FAMILY MEDICINE CLINIC | Age: 57
End: 2022-05-20
Payer: COMMERCIAL

## 2022-05-20 VITALS
OXYGEN SATURATION: 97 % | DIASTOLIC BLOOD PRESSURE: 78 MMHG | BODY MASS INDEX: 37.37 KG/M2 | HEIGHT: 69 IN | WEIGHT: 252.3 LBS | SYSTOLIC BLOOD PRESSURE: 122 MMHG | TEMPERATURE: 98.2 F | RESPIRATION RATE: 18 BRPM | HEART RATE: 73 BPM

## 2022-05-20 DIAGNOSIS — I10 ESSENTIAL HYPERTENSION: ICD-10-CM

## 2022-05-20 DIAGNOSIS — E66.01 CLASS 2 SEVERE OBESITY DUE TO EXCESS CALORIES WITH SERIOUS COMORBIDITY AND BODY MASS INDEX (BMI) OF 37.0 TO 37.9 IN ADULT (HCC): ICD-10-CM

## 2022-05-20 DIAGNOSIS — E11.9 CONTROLLED TYPE 2 DIABETES MELLITUS WITHOUT COMPLICATION, WITHOUT LONG-TERM CURRENT USE OF INSULIN (HCC): Primary | ICD-10-CM

## 2022-05-20 PROCEDURE — 3044F HG A1C LEVEL LT 7.0%: CPT | Performed by: FAMILY MEDICINE

## 2022-05-20 PROCEDURE — 99214 OFFICE O/P EST MOD 30 MIN: CPT | Performed by: FAMILY MEDICINE

## 2022-05-20 RX ORDER — AZELAIC ACID 0.15 G/G
GEL TOPICAL
COMMUNITY
Start: 2022-04-21

## 2022-05-20 RX ORDER — TADALAFIL 5 MG/1
5 TABLET ORAL DAILY
Qty: 30 TABLET | Refills: 5 | Status: SHIPPED | OUTPATIENT
Start: 2022-05-20

## 2022-05-20 SDOH — ECONOMIC STABILITY: FOOD INSECURITY: WITHIN THE PAST 12 MONTHS, THE FOOD YOU BOUGHT JUST DIDN'T LAST AND YOU DIDN'T HAVE MONEY TO GET MORE.: NEVER TRUE

## 2022-05-20 SDOH — ECONOMIC STABILITY: FOOD INSECURITY: WITHIN THE PAST 12 MONTHS, YOU WORRIED THAT YOUR FOOD WOULD RUN OUT BEFORE YOU GOT MONEY TO BUY MORE.: NEVER TRUE

## 2022-05-20 ASSESSMENT — SOCIAL DETERMINANTS OF HEALTH (SDOH): HOW HARD IS IT FOR YOU TO PAY FOR THE VERY BASICS LIKE FOOD, HOUSING, MEDICAL CARE, AND HEATING?: NOT HARD AT ALL

## 2022-05-20 NOTE — PROGRESS NOTES
DM2:   Patient is here to fu regarding DM2. Patient is  controlled. Taking all medications and tolerating well. Fasting sugars are running 100-140. Patient is taking ASA and Ace Inhibitor/ARB. Patient is  on appropriately-dosed statin. LDL is  at goal.  BP is  controlled. No hypoglycemic episodes. Patient does not see Podiatry regularly. Saw an Eye Dr 2022. Patient is aware that it is necessary to see an Eye Dr yearly. Patient does not smoke. Most recent labs reviewed with patient. Patient does not have complaints or concerns today. Lab Results   Component Value Date    LABA1C 5.2 05/06/2022       Lab Results   Component Value Date    LDLCALC 105 (H) 05/06/2022        Patient's past medical, surgical, social and/or family history reviewed, updated in chart, and are non-contributory (unless otherwise stated). Medications and allergies also reviewed and updated in chart.       Review of Systems:  Constitutional:  No fever, no fatigue, no chills, no headaches, no weight change  Dermatology:  No rash, no mole, no dry or sensitive skin  ENT:  No cough, no sore throat, no sinus pain, no runny nose, no ear pain  Cardiology:  No chest pain, no palpitations, no leg edema, no shortness of breath, no PND  Gastroenterology:  No dysphagia, no abdominal pain, no nausea, no vomiting, no constipation, no diarrhea, no heartburn  Musculoskeletal:  No joint pain, no leg cramps, no back pain, no muscle aches  Respiratory:  No shortness of breath, no orthopnea, no wheezing, no GORMAN, no hemoptysis  Urology:  No blood in the urine, no urinary frequency, no urinary incontinence, no urinary urgency, no nocturia, no dysuria  Vitals:    05/20/22 1538   BP: 122/78   Pulse: 73   Resp: 18   Temp: 98.2 °F (36.8 °C)   TempSrc: Temporal   SpO2: 97%   Weight: 252 lb 4.8 oz (114.4 kg)   Height: 5' 9\" (1.753 m)       General:  Patient alert and oriented x 3, NAD, pleasant  HEENT:  Atraumatic, normocephalic, PERRLA, EOMI, clear conjunctiva, TMs clear, nose-clear, throat - no erythema  Neck:  Supple, no goiter, no carotid bruits, no lymphadenopathy  Lungs:  CTA B  Heart:  RRR, no murmurs, gallops or rubs  Abdomen:  Soft/nt/nd, + bowel sounds  Extremities:  No clubbing, cyanosis or edema  Skin: unremarkable    Assessment/Plan:      Tess Leal was seen today for diabetes and discuss labs. Diagnoses and all orders for this visit:    Controlled type 2 diabetes mellitus without complication, without long-term current use of insulin ([address]  -     Comprehensive Metabolic Panel; Future  -     CBC; Future  -     Lipid Panel; Future  -     Hemoglobin A1C; Future    Essential hypertension    Class 2 severe obesity due to excess calories with serious comorbidity and body mass index (BMI) of 37.0 to 37.9 in adult [facility])    Other orders  -     tadalafil (CIALIS) 5 MG tablet; Take 1 tablet by mouth daily      As above. Call or go to ED immediately if symptoms worsen or persist.  No follow-ups on file. , or sooner if necessary. Educational materials and/or home exercises printed for patient's review and were included in patient instructions on his/her After Visit Summary and given to patient at the end of visit. Counseled regarding above diagnosis, including possible risks and complications,  especially if left uncontrolled. Counseled regarding the possible side effects, risks, benefits and alternatives to treatment; patient and/or guardian verbalizes understanding, agrees, feels comfortable with and wishes to proceed with above treatment plan. Advised patient to call with any new medication issues, and read all Rx info from pharmacy to assure aware of all possible risks and side effects of medication before taking. Reviewed age and gender appropriate health screening exams and vaccinations.   Advised patient regarding importance of keeping up with recommended health maintenance and to schedule as soon as possible if overdue, as this is important in assessing for undiagnosed pathology, especially cancer, as well as protecting against potentially harmful/life threatening disease. Patient and/or guardian verbalizes understanding and agrees with above counseling, assessment and plan. All questions answered. Jesus Iniguez MD  5/23/2022    I have personally reviewed and updated the chief complaint, HPI, Past Medical, Family and Social History, as well as the above Review of Systems.

## 2022-06-16 DIAGNOSIS — I10 ESSENTIAL HYPERTENSION: ICD-10-CM

## 2022-06-16 RX ORDER — HYDROCHLOROTHIAZIDE 25 MG/1
TABLET ORAL
Qty: 90 TABLET | Refills: 3 | Status: SHIPPED | OUTPATIENT
Start: 2022-06-16

## 2022-07-27 RX ORDER — ERGOCALCIFEROL 1.25 MG/1
CAPSULE ORAL
Qty: 24 CAPSULE | Refills: 1 | Status: SHIPPED | OUTPATIENT
Start: 2022-07-27

## 2022-09-20 RX ORDER — ORAL SEMAGLUTIDE 7 MG/1
1 TABLET ORAL DAILY
Qty: 90 TABLET | Refills: 0 | Status: SHIPPED | OUTPATIENT
Start: 2022-09-20

## 2022-10-11 DIAGNOSIS — E11.9 CONTROLLED TYPE 2 DIABETES MELLITUS WITHOUT COMPLICATION, WITHOUT LONG-TERM CURRENT USE OF INSULIN (HCC): ICD-10-CM

## 2022-10-11 LAB
ALBUMIN SERPL-MCNC: 4.4 G/DL (ref 3.5–5.2)
ALP BLD-CCNC: 63 U/L (ref 40–129)
ALT SERPL-CCNC: 23 U/L (ref 0–40)
ANION GAP SERPL CALCULATED.3IONS-SCNC: 11 MMOL/L (ref 7–16)
AST SERPL-CCNC: 18 U/L (ref 0–39)
BILIRUB SERPL-MCNC: 0.6 MG/DL (ref 0–1.2)
BUN BLDV-MCNC: 21 MG/DL (ref 6–20)
CALCIUM SERPL-MCNC: 9.7 MG/DL (ref 8.6–10.2)
CHLORIDE BLD-SCNC: 105 MMOL/L (ref 98–107)
CHOLESTEROL, TOTAL: 189 MG/DL (ref 0–199)
CO2: 27 MMOL/L (ref 22–29)
CREAT SERPL-MCNC: 1 MG/DL (ref 0.7–1.2)
GFR AFRICAN AMERICAN: >60
GFR NON-AFRICAN AMERICAN: >60 ML/MIN/1.73
GLUCOSE BLD-MCNC: 127 MG/DL (ref 74–99)
HBA1C MFR BLD: 6 % (ref 4–5.6)
HCT VFR BLD CALC: 45.9 % (ref 37–54)
HDLC SERPL-MCNC: 54 MG/DL
HEMOGLOBIN: 15.6 G/DL (ref 12.5–16.5)
LDL CHOLESTEROL CALCULATED: 120 MG/DL (ref 0–99)
MCH RBC QN AUTO: 30.4 PG (ref 26–35)
MCHC RBC AUTO-ENTMCNC: 34 % (ref 32–34.5)
MCV RBC AUTO: 89.3 FL (ref 80–99.9)
PDW BLD-RTO: 12.9 FL (ref 11.5–15)
PLATELET # BLD: 204 E9/L (ref 130–450)
PMV BLD AUTO: 10.2 FL (ref 7–12)
POTASSIUM SERPL-SCNC: 3.6 MMOL/L (ref 3.5–5)
RBC # BLD: 5.14 E12/L (ref 3.8–5.8)
SODIUM BLD-SCNC: 143 MMOL/L (ref 132–146)
TOTAL PROTEIN: 7 G/DL (ref 6.4–8.3)
TRIGL SERPL-MCNC: 75 MG/DL (ref 0–149)
VLDLC SERPL CALC-MCNC: 15 MG/DL
WBC # BLD: 8.1 E9/L (ref 4.5–11.5)

## 2022-11-07 ENCOUNTER — TELEPHONE (OUTPATIENT)
Dept: FAMILY MEDICINE CLINIC | Age: 57
End: 2022-11-07

## 2022-11-07 NOTE — TELEPHONE ENCOUNTER
----- Message from Татьяна Mejia sent at 11/4/2022  9:16 AM EDT -----  Subject: Message to Provider    QUESTIONS  Information for Provider? pt wants to know when he can get a flu shot   requesting a call back   ---------------------------------------------------------------------------  --------------  [address]  [phone]; OK to leave message on voicemail, OK to respond with   electronic message via [employer] portal (only for patients who have   registered [employer] account)  ---------------------------------------------------------------------------  --------------  SCRIPT ANSWERS  Relationship to Patient?  Self

## 2022-11-14 RX ORDER — TADALAFIL 5 MG/1
TABLET ORAL
Qty: 30 TABLET | Refills: 5 | Status: SHIPPED | OUTPATIENT
Start: 2022-11-14

## 2022-11-28 RX ORDER — OLMESARTAN MEDOXOMIL 40 MG/1
TABLET ORAL
Qty: 90 TABLET | Refills: 3 | Status: SHIPPED | OUTPATIENT
Start: 2022-11-28

## 2022-11-28 RX ORDER — AMLODIPINE BESYLATE 10 MG/1
TABLET ORAL
Qty: 90 TABLET | Refills: 3 | Status: SHIPPED | OUTPATIENT
Start: 2022-11-28

## 2022-12-02 ENCOUNTER — OFFICE VISIT (OUTPATIENT)
Dept: FAMILY MEDICINE CLINIC | Age: 57
End: 2022-12-02
Payer: COMMERCIAL

## 2022-12-02 VITALS
RESPIRATION RATE: 16 BRPM | WEIGHT: 268.2 LBS | OXYGEN SATURATION: 95 % | HEIGHT: 69 IN | BODY MASS INDEX: 39.72 KG/M2 | HEART RATE: 81 BPM | DIASTOLIC BLOOD PRESSURE: 85 MMHG | SYSTOLIC BLOOD PRESSURE: 130 MMHG | TEMPERATURE: 98.1 F

## 2022-12-02 DIAGNOSIS — E11.9 CONTROLLED TYPE 2 DIABETES MELLITUS WITHOUT COMPLICATION, WITHOUT LONG-TERM CURRENT USE OF INSULIN (HCC): Primary | ICD-10-CM

## 2022-12-02 PROCEDURE — 3074F SYST BP LT 130 MM HG: CPT | Performed by: FAMILY MEDICINE

## 2022-12-02 PROCEDURE — 90471 IMMUNIZATION ADMIN: CPT | Performed by: FAMILY MEDICINE

## 2022-12-02 PROCEDURE — 99396 PREV VISIT EST AGE 40-64: CPT | Performed by: FAMILY MEDICINE

## 2022-12-02 PROCEDURE — 3078F DIAST BP <80 MM HG: CPT | Performed by: FAMILY MEDICINE

## 2022-12-02 PROCEDURE — 82044 UR ALBUMIN SEMIQUANTITATIVE: CPT | Performed by: FAMILY MEDICINE

## 2022-12-02 PROCEDURE — 90674 CCIIV4 VAC NO PRSV 0.5 ML IM: CPT | Performed by: FAMILY MEDICINE

## 2022-12-02 NOTE — PROGRESS NOTES
Annual exam:  Patient is here for routine yearly physical/preventative visit. Patient has no complaints or concerns today. Patient is  generally healthy. Chronic medical conditions are generally controlled. Most recent labs reviewed with patient and  are remarkable. Health maintenance reviewed with patient and is  up to date. Overall doing well. Pt is not fasting. Last labs done on 10/11/22. HM reviewed. Pt asked to leave a urine for micro. Past Medical History:   Diagnosis Date    Encounter for screening colonoscopy 01/2018    Hepatitis     Hepatitis C antibody    Hypertension     Pain in both feet 4/16/2021    Sleep apnea with use of continuous positive airway pressure (CPAP)       Past Surgical History:   Procedure Laterality Date    COLONOSCOPY  01/22/2018    ELBOW SURGERY  2007    fall    FOOT SURGERY  2007    fall    WRIST SURGERY  2007    fall from ladder      Family History   Problem Relation Age of Onset    Cancer Mother         bladder x6    Glaucoma Mother     High Blood Pressure Mother     High Blood Pressure Father     Heart Disease Father 66        MI    Stroke Father 68    High Blood Pressure Paternal Grandmother     Heart Disease Paternal Grandmother     Other Maternal Cousin         seizures    Heart Disease Paternal Aunt     Heart Disease Paternal Grandfather     Heart Disease Paternal Cousin      Social History     Socioeconomic History    Marital status:      Spouse name: Not on file    Number of children: Not on file    Years of education: Not on file    Highest education level: Not on file   Occupational History    Not on file   Tobacco Use    Smoking status: Never    Smokeless tobacco: Never   Vaping Use    Vaping Use: Never used   Substance and Sexual Activity    Alcohol use:  Yes     Alcohol/week: 3.0 standard drinks     Types: 3 Cans of beer per week     Comment: 2-3 beer per weekly    Drug use: No    Sexual activity: Not on file   Other Topics Concern    Not on file   Social History Narrative    Not on file     Social Determinants of Health     Financial Resource Strain: Low Risk     Difficulty of Paying Living Expenses: Not hard at all   Food Insecurity: No Food Insecurity    Worried About Running Out of Food in the Last Year: Never true    Ran Out of Food in the Last Year: Never true   Transportation Needs: Not on file   Physical Activity: Not on file   Stress: Not on file   Social Connections: Not on file   Intimate Partner Violence: Not on file   Housing Stability: Not on file      No Known Allergies     Review of Systems:  Constitutional:  No fever, no fatigue, no chills, no headaches, no weight change  Dermatology:  No rash, no mole, no dry or sensitive skin  ENT:  No cough, no sore throat, no sinus pain, no runny nose, no ear pain  Cardiology:  No chest pain, no palpitations, no leg edema, no shortness of breath, no PND  Endocrinology:  No polydipsia, no polyuria, no cold intolerance, no heat intolerance, no polyphagia, no hair changes  Gastroenterology:  No dysphagia, no abdominal pain, no nausea, no vomiting, no constipation, no diarrhea, no heartburn  Female Reproductive:  No hot flashes, no abnormal vaginal discharge, no pain with menstruation, no pelvic pain  Musculoskeletal:  No joint pain, no leg cramps, no back pain, no muscle aches  Respiratory:  No shortness of breath, no orthopnea, no wheezing, no GORMAN, no hemoptysis  Urology:  No blood in the urine, no urinary frequency, no urinary incontinence, no urinary urgency, no nocturia, no dysuria  Neurology:  No numbness/tingling, no dizziness, no weakness  Psychology:  No depression, no sleep disturbances, no suicidal ideation, no anxiety  Physical Exam:  Vitals:    12/02/22 1404   BP: 130/85   Pulse: 81   Resp: 16   Temp: 98.1 °F (36.7 °C)   TempSrc: Temporal   SpO2: 95%   Weight: 268 lb 3.2 oz (121.7 kg)   Height: 5' 9\" (1.753 m)     General:  Patient alert and oriented x 3, NAD, pleasant  HEENT:  Atraumatic, normocephalic, PERRLA, EOMI, clear conjunctiva, TMs clear, nose-clear, throat - no erythema, tonsils- wnl  Neck:  Supple, no goiter, no carotid bruits, no lymphadenopathy  Lungs:  CTA B  Heart:  RRR, no murmurs, gallops or rubs  Abdomen:  Soft, NTND, + bowel sounds  Back: full ROM, no CVA tenderness  Extremities:  No clubbing, cyanosis or edema  Neuro:  CN II-XII grossly intact, 5/5 strength in bilateral upper and lower extremities, 2 + reflexes. Skin: unremarkable    Assessment/Plan:  Zane Spangler was seen today for annual exam.    Diagnoses and all orders for this visit:    Controlled type 2 diabetes mellitus without complication, without long-term current use of insulin ([facility] [address]  -     POCT Microalbumin    Other orders  -     Influenza, FLUCELVAX, (age 10 mo+), IM, Preservative Free, 0.5 mL    As above. Call or go to ED immediately if symptoms worsen or persist.  No follow-ups on file. or sooner if necessary. Educational materials and/or home exercises printed for patient's review and were included in patient instructions on his/her After Visit Summary and given to patient at the end of visit. Counseled regarding above diagnosis, including possible risks and complications,  especially if left uncontrolled. Counseled regarding the possible side effects, risks, benefits and alternatives to treatment; patient and/or guardian verbalizes understanding, agrees, feels comfortable with and wishes to proceed with above treatment plan. Advised patient to call with any new medication issues, and read all Rx info from pharmacy to assure aware of all possible risks and side effects of medication before taking. Reviewed age and gender appropriate health screening exams and vaccinations.   Advised patient regarding importance of keeping up with recommended health maintenance and to schedule as soon as possible if overdue, as this is important in assessing for undiagnosed pathology, especially cancer, as well as protecting against potentially harmful/life threatening disease. Patient and/or guardian verbalizes understanding and agrees with above counseling, assessment and plan. All questions answered. Nydia Agee MD  12/7/2022    I have personally reviewed and updated the chief complaint, HPI, Past Medical, Family and Social History, as well as the above Review of Systems.

## 2023-01-30 RX ORDER — ERGOCALCIFEROL 1.25 MG/1
CAPSULE ORAL
Qty: 24 CAPSULE | Refills: 1 | Status: SHIPPED | OUTPATIENT
Start: 2023-01-30

## 2023-02-13 RX ORDER — BLOOD SUGAR DIAGNOSTIC
STRIP MISCELLANEOUS
Qty: 100 STRIP | Refills: 3 | Status: SHIPPED | OUTPATIENT
Start: 2023-02-13

## 2023-03-02 DIAGNOSIS — I10 ESSENTIAL HYPERTENSION: ICD-10-CM

## 2023-03-02 RX ORDER — HYDROCHLOROTHIAZIDE 25 MG/1
TABLET ORAL
Qty: 90 TABLET | Refills: 3 | Status: SHIPPED | OUTPATIENT
Start: 2023-03-02

## 2023-03-03 ENCOUNTER — OFFICE VISIT (OUTPATIENT)
Dept: FAMILY MEDICINE CLINIC | Age: 58
End: 2023-03-03

## 2023-03-03 VITALS
RESPIRATION RATE: 18 BRPM | HEIGHT: 69 IN | TEMPERATURE: 97.4 F | DIASTOLIC BLOOD PRESSURE: 89 MMHG | BODY MASS INDEX: 38.95 KG/M2 | HEART RATE: 74 BPM | OXYGEN SATURATION: 98 % | WEIGHT: 263 LBS | SYSTOLIC BLOOD PRESSURE: 130 MMHG

## 2023-03-03 DIAGNOSIS — E66.01 CLASS 2 SEVERE OBESITY DUE TO EXCESS CALORIES WITH SERIOUS COMORBIDITY AND BODY MASS INDEX (BMI) OF 37.0 TO 37.9 IN ADULT (HCC): ICD-10-CM

## 2023-03-03 DIAGNOSIS — E11.9 CONTROLLED TYPE 2 DIABETES MELLITUS WITHOUT COMPLICATION, WITHOUT LONG-TERM CURRENT USE OF INSULIN (HCC): Primary | ICD-10-CM

## 2023-03-03 DIAGNOSIS — I10 ESSENTIAL HYPERTENSION: ICD-10-CM

## 2023-03-03 DIAGNOSIS — Z12.5 SCREENING FOR PROSTATE CANCER: ICD-10-CM

## 2023-03-03 LAB
CREATININE URINE POCT: NORMAL
HBA1C MFR BLD: 5.4 %
MICROALBUMIN/CREAT 24H UR: NORMAL MG/G{CREAT}
MICROALBUMIN/CREAT UR-RTO: NORMAL

## 2023-03-03 RX ORDER — NEOMYCIN SULFATE, POLYMYXIN B SULFATE, AND DEXAMETHASONE 3.5; 10000; 1 MG/G; [USP'U]/G; MG/G
OINTMENT OPHTHALMIC
COMMUNITY
Start: 2023-01-23

## 2023-03-03 SDOH — ECONOMIC STABILITY: INCOME INSECURITY: HOW HARD IS IT FOR YOU TO PAY FOR THE VERY BASICS LIKE FOOD, HOUSING, MEDICAL CARE, AND HEATING?: NOT HARD AT ALL

## 2023-03-03 SDOH — ECONOMIC STABILITY: HOUSING INSECURITY
IN THE LAST 12 MONTHS, WAS THERE A TIME WHEN YOU DID NOT HAVE A STEADY PLACE TO SLEEP OR SLEPT IN A SHELTER (INCLUDING NOW)?: NO

## 2023-03-03 SDOH — ECONOMIC STABILITY: FOOD INSECURITY: WITHIN THE PAST 12 MONTHS, THE FOOD YOU BOUGHT JUST DIDN'T LAST AND YOU DIDN'T HAVE MONEY TO GET MORE.: NEVER TRUE

## 2023-03-03 SDOH — ECONOMIC STABILITY: FOOD INSECURITY: WITHIN THE PAST 12 MONTHS, YOU WORRIED THAT YOUR FOOD WOULD RUN OUT BEFORE YOU GOT MONEY TO BUY MORE.: NEVER TRUE

## 2023-03-03 ASSESSMENT — PATIENT HEALTH QUESTIONNAIRE - PHQ9
SUM OF ALL RESPONSES TO PHQ QUESTIONS 1-9: 0
2. FEELING DOWN, DEPRESSED OR HOPELESS: 0
SUM OF ALL RESPONSES TO PHQ9 QUESTIONS 1 & 2: 0
SUM OF ALL RESPONSES TO PHQ QUESTIONS 1-9: 0
SUM OF ALL RESPONSES TO PHQ QUESTIONS 1-9: 0
1. LITTLE INTEREST OR PLEASURE IN DOING THINGS: 0
SUM OF ALL RESPONSES TO PHQ QUESTIONS 1-9: 0

## 2023-03-03 NOTE — PROGRESS NOTES
DM2:   Patient is here to fu regarding DM2. Patient is  controlled. Taking all medications and tolerating well. Patient is taking ASA and Ace Inhibitor/ARB. Patient is aware that it is necessary to see an Eye Dr yearly. Patient does not smoke. Most recent labs reviewed with patient. Patient does not have complaints or concerns today. Lab Results   Component Value Date    LABA1C 5.4 03/03/2023       Lab Results   Component Value Date    LDLCALC 120 (H) 10/11/2022        Patient's past medical, surgical, social and/or family history reviewed, updated in chart, and are non-contributory (unless otherwise stated). Medications and allergies also reviewed and updated in chart.       Review of Systems:  Constitutional:  No fever, no fatigue, no chills, no headaches, no weight change  Dermatology:  No rash, no mole, no dry or sensitive skin  ENT:  No cough, no sore throat, no sinus pain, no runny nose, no ear pain  Cardiology:  No chest pain, no palpitations, no leg edema, no shortness of breath, no PND  Gastroenterology:  No dysphagia, no abdominal pain, no nausea, no vomiting, no constipation, no diarrhea, no heartburn  Musculoskeletal:  No joint pain, no leg cramps, no back pain, no muscle aches  Respiratory:  No shortness of breath, no orthopnea, no wheezing, no GORMAN, no hemoptysis  Urology:  No blood in the urine, no urinary frequency, no urinary incontinence, no urinary urgency, no nocturia, no dysuria  Vitals:    03/03/23 1301 03/03/23 1307   BP: (!) 135/91 130/89   Site: Left Upper Arm Right Upper Arm   Position: Sitting Sitting   Pulse: 74    Resp: 18    Temp: 97.4 °F (36.3 °C)    SpO2: 98%    Weight: 263 lb (119.3 kg)    Height: 5' 9\" (1.753 m)        General:  Patient alert and oriented x 3, NAD, pleasant  HEENT:  Atraumatic, normocephalic, PERRLA, EOMI, clear conjunctiva, TMs clear, nose-clear, throat - no erythema  Neck:  Supple, no goiter, no carotid bruits, no lymphadenopathy  Lungs:  CTA B  Heart:  RRR, no murmurs, gallops or rubs  Abdomen:  Soft/nt/nd, + bowel sounds  Extremities:  No clubbing, cyanosis or edema  Skin: unremarkable    Assessment/Plan:      Juan Thomas was seen today for diabetes. Diagnoses and all orders for this visit:    Controlled type 2 diabetes mellitus without complication, without long-term current use of insulin (HCC)  -     POCT glycosylated hemoglobin (Hb A1C)  -     Comprehensive Metabolic Panel; Future  -     CBC; Future  -     Lipid Panel; Future  -     Hemoglobin A1C; Future  -     POCT Microalbumin    Essential hypertension    Class 2 severe obesity due to excess calories with serious comorbidity and body mass index (BMI) of 37.0 to 37.9 in [facility])    Screening for prostate cancer  -     PSA Screening; Future    As above. Call or go to ED immediately if symptoms worsen or persist.  No follow-ups on file. , or sooner if necessary. Educational materials and/or home exercises printed for patient's review and were included in patient instructions on his/her After Visit Summary and given to patient at the end of visit. Counseled regarding above diagnosis, including possible risks and complications,  especially if left uncontrolled. Counseled regarding the possible side effects, risks, benefits and alternatives to treatment; patient and/or guardian verbalizes understanding, agrees, feels comfortable with and wishes to proceed with above treatment plan. Advised patient to call with any new medication issues, and read all Rx info from pharmacy to assure aware of all possible risks and side effects of medication before taking. Reviewed age and gender appropriate health screening exams and vaccinations.   Advised patient regarding importance of keeping up with recommended health maintenance and to schedule as soon as possible if overdue, as this is important in assessing for undiagnosed pathology, especially cancer, as well as protecting against potentially harmful/life threatening disease. Patient and/or guardian verbalizes understanding and agrees with above counseling, assessment and plan. All questions answered. Beau Lopez MD  3/5/2023    I have personally reviewed and updated the chief complaint, HPI, Past Medical, Family and Social History, as well as the above Review of Systems.

## 2023-03-23 RX ORDER — ORAL SEMAGLUTIDE 7 MG/1
TABLET ORAL
Qty: 90 TABLET | Refills: 3 | Status: SHIPPED | OUTPATIENT
Start: 2023-03-23

## 2023-04-17 RX ORDER — POTASSIUM CHLORIDE 20 MEQ/1
TABLET, EXTENDED RELEASE ORAL
Qty: 180 TABLET | Refills: 3 | Status: SHIPPED | OUTPATIENT
Start: 2023-04-17

## 2023-05-15 RX ORDER — TADALAFIL 5 MG/1
TABLET ORAL
Qty: 30 TABLET | Refills: 5 | Status: SHIPPED | OUTPATIENT
Start: 2023-05-15

## 2023-07-03 RX ORDER — ERGOCALCIFEROL 1.25 MG/1
CAPSULE ORAL
Qty: 24 CAPSULE | Refills: 1 | Status: SHIPPED | OUTPATIENT
Start: 2023-07-03

## 2023-08-25 ENCOUNTER — OFFICE VISIT (OUTPATIENT)
Dept: FAMILY MEDICINE CLINIC | Age: 58
End: 2023-08-25
Payer: COMMERCIAL

## 2023-08-25 VITALS
SYSTOLIC BLOOD PRESSURE: 132 MMHG | TEMPERATURE: 98 F | DIASTOLIC BLOOD PRESSURE: 74 MMHG | RESPIRATION RATE: 16 BRPM | HEIGHT: 69 IN | OXYGEN SATURATION: 98 % | BODY MASS INDEX: 39.4 KG/M2 | WEIGHT: 266 LBS | HEART RATE: 76 BPM

## 2023-08-25 DIAGNOSIS — E66.01 CLASS 2 SEVERE OBESITY DUE TO EXCESS CALORIES WITH SERIOUS COMORBIDITY AND BODY MASS INDEX (BMI) OF 37.0 TO 37.9 IN ADULT (HCC): ICD-10-CM

## 2023-08-25 DIAGNOSIS — E11.9 CONTROLLED TYPE 2 DIABETES MELLITUS WITHOUT COMPLICATION, WITHOUT LONG-TERM CURRENT USE OF INSULIN (HCC): Primary | ICD-10-CM

## 2023-08-25 DIAGNOSIS — I10 ESSENTIAL HYPERTENSION: ICD-10-CM

## 2023-08-25 LAB — HBA1C MFR BLD: 5.9 %

## 2023-08-25 PROCEDURE — 3074F SYST BP LT 130 MM HG: CPT | Performed by: FAMILY MEDICINE

## 2023-08-25 PROCEDURE — 83036 HEMOGLOBIN GLYCOSYLATED A1C: CPT | Performed by: FAMILY MEDICINE

## 2023-08-25 PROCEDURE — 3044F HG A1C LEVEL LT 7.0%: CPT | Performed by: FAMILY MEDICINE

## 2023-08-25 PROCEDURE — 3078F DIAST BP <80 MM HG: CPT | Performed by: FAMILY MEDICINE

## 2023-08-25 PROCEDURE — 99214 OFFICE O/P EST MOD 30 MIN: CPT | Performed by: FAMILY MEDICINE

## 2023-08-30 DIAGNOSIS — E11.9 CONTROLLED TYPE 2 DIABETES MELLITUS WITHOUT COMPLICATION, WITHOUT LONG-TERM CURRENT USE OF INSULIN (HCC): ICD-10-CM

## 2023-08-30 DIAGNOSIS — Z12.5 SCREENING FOR PROSTATE CANCER: ICD-10-CM

## 2023-08-30 LAB
ALBUMIN SERPL-MCNC: 4.2 G/DL (ref 3.5–5.2)
ALP BLD-CCNC: 67 U/L (ref 40–129)
ALT SERPL-CCNC: 36 U/L (ref 0–40)
ANION GAP SERPL CALCULATED.3IONS-SCNC: 11 MMOL/L (ref 7–16)
AST SERPL-CCNC: 23 U/L (ref 0–39)
BILIRUB SERPL-MCNC: 0.6 MG/DL (ref 0–1.2)
BUN BLDV-MCNC: 11 MG/DL (ref 6–20)
CALCIUM SERPL-MCNC: 9 MG/DL (ref 8.6–10.2)
CHLORIDE BLD-SCNC: 107 MMOL/L (ref 98–107)
CHOLESTEROL: 157 MG/DL
CO2: 26 MMOL/L (ref 22–29)
CREAT SERPL-MCNC: 0.9 MG/DL (ref 0.7–1.2)
GFR SERPL CREATININE-BSD FRML MDRD: >60 ML/MIN/1.73M2
GLUCOSE BLD-MCNC: 113 MG/DL (ref 74–99)
HBA1C MFR BLD: 5.9 % (ref 4–5.6)
HCT VFR BLD CALC: 46.2 % (ref 37–54)
HDLC SERPL-MCNC: 49 MG/DL
HEMOGLOBIN: 15.9 G/DL (ref 12.5–16.5)
LDL CHOLESTEROL: 93 MG/DL
MCH RBC QN AUTO: 30.7 PG (ref 26–35)
MCHC RBC AUTO-ENTMCNC: 34.4 G/DL (ref 32–34.5)
MCV RBC AUTO: 89.2 FL (ref 80–99.9)
PDW BLD-RTO: 12.7 % (ref 11.5–15)
PLATELET # BLD: 193 K/UL (ref 130–450)
PMV BLD AUTO: 9.6 FL (ref 7–12)
POTASSIUM SERPL-SCNC: 3.6 MMOL/L (ref 3.5–5)
PROSTATE SPECIFIC ANTIGEN: 1.4 NG/ML (ref 0–4)
RBC # BLD: 5.18 M/UL (ref 3.8–5.8)
SODIUM BLD-SCNC: 144 MMOL/L (ref 132–146)
TOTAL PROTEIN: 6.6 G/DL (ref 6.4–8.3)
TRIGL SERPL-MCNC: 77 MG/DL
VLDLC SERPL CALC-MCNC: 15 MG/DL
WBC # BLD: 7.8 K/UL (ref 4.5–11.5)

## 2023-10-18 ENCOUNTER — PATIENT MESSAGE (OUTPATIENT)
Dept: FAMILY MEDICINE CLINIC | Age: 58
End: 2023-10-18

## 2023-10-18 NOTE — TELEPHONE ENCOUNTER
From: David Downs  To: Dr. Shefali Rodriguez: 10/18/2023 1:42 PM EDT  Subject: Flu Vaccine    I am interested in getting the flu vaccine, as I do every year. Do I need to schedule an appointment? Are there any walk-in times where I can get the vaccine without an appointment?   Many thanks,  Graeme Hansen

## 2023-10-22 ENCOUNTER — HOSPITAL ENCOUNTER (EMERGENCY)
Age: 58
Discharge: HOME | End: 2023-10-22
Payer: COMMERCIAL

## 2023-10-22 ENCOUNTER — APPOINTMENT (OUTPATIENT)
Dept: GENERAL RADIOLOGY | Age: 58
End: 2023-10-22
Payer: COMMERCIAL

## 2023-10-22 VITALS
WEIGHT: 240 LBS | HEART RATE: 84 BPM | BODY MASS INDEX: 35.55 KG/M2 | TEMPERATURE: 98.2 F | HEIGHT: 69 IN | SYSTOLIC BLOOD PRESSURE: 129 MMHG | RESPIRATION RATE: 16 BRPM | OXYGEN SATURATION: 100 % | DIASTOLIC BLOOD PRESSURE: 82 MMHG

## 2023-10-22 DIAGNOSIS — S61.012A LACERATION OF LEFT THUMB WITHOUT FOREIGN BODY WITHOUT DAMAGE TO NAIL, INITIAL ENCOUNTER: Primary | ICD-10-CM

## 2023-10-22 PROCEDURE — 99283 EMERGENCY DEPT VISIT LOW MDM: CPT

## 2023-10-22 PROCEDURE — 6370000000 HC RX 637 (ALT 250 FOR IP): Performed by: NURSE PRACTITIONER

## 2023-10-22 PROCEDURE — 2500000003 HC RX 250 WO HCPCS: Performed by: NURSE PRACTITIONER

## 2023-10-22 PROCEDURE — 12001 RPR S/N/AX/GEN/TRNK 2.5CM/<: CPT

## 2023-10-22 PROCEDURE — 73140 X-RAY EXAM OF FINGER(S): CPT

## 2023-10-22 RX ORDER — LIDOCAINE HYDROCHLORIDE 10 MG/ML
5 INJECTION, SOLUTION INFILTRATION; PERINEURAL ONCE
Status: COMPLETED | OUTPATIENT
Start: 2023-10-22 | End: 2023-10-22

## 2023-10-22 RX ORDER — GINSENG 100 MG
CAPSULE ORAL ONCE
Status: COMPLETED | OUTPATIENT
Start: 2023-10-22 | End: 2023-10-22

## 2023-10-22 RX ADMIN — BACITRACIN: 500 OINTMENT TOPICAL at 17:42

## 2023-10-22 RX ADMIN — LIDOCAINE HYDROCHLORIDE 5 ML: 10 INJECTION, SOLUTION INFILTRATION; PERINEURAL at 17:41

## 2023-10-22 ASSESSMENT — PAIN DESCRIPTION - DESCRIPTORS
DESCRIPTORS: THROBBING
DESCRIPTORS: DISCOMFORT

## 2023-10-22 ASSESSMENT — PAIN DESCRIPTION - PAIN TYPE: TYPE: ACUTE PAIN

## 2023-10-22 ASSESSMENT — PAIN - FUNCTIONAL ASSESSMENT
PAIN_FUNCTIONAL_ASSESSMENT: ACTIVITIES ARE NOT PREVENTED
PAIN_FUNCTIONAL_ASSESSMENT: 0-10

## 2023-10-22 ASSESSMENT — PAIN DESCRIPTION - ORIENTATION
ORIENTATION: LEFT
ORIENTATION: LEFT

## 2023-10-22 ASSESSMENT — PAIN DESCRIPTION - ONSET: ONSET: ON-GOING

## 2023-10-22 ASSESSMENT — PAIN SCALES - GENERAL
PAINLEVEL_OUTOF10: 1
PAINLEVEL_OUTOF10: 1

## 2023-10-22 ASSESSMENT — PAIN DESCRIPTION - LOCATION
LOCATION: FINGER (COMMENT WHICH ONE)
LOCATION: FINGER (COMMENT WHICH ONE)

## 2023-10-22 ASSESSMENT — PAIN DESCRIPTION - FREQUENCY: FREQUENCY: CONTINUOUS

## 2023-10-31 ENCOUNTER — TELEPHONE (OUTPATIENT)
Dept: FAMILY MEDICINE CLINIC | Age: 58
End: 2023-10-31

## 2023-10-31 NOTE — TELEPHONE ENCOUNTER
Pt needed stitches removed from cutting finger. Was seen in  [place] on [place]23. He also wants to to get the flu shot as well during the visit. Please advise.

## 2023-11-01 NOTE — TELEPHONE ENCOUNTER
L/m with day and time, but told pt to call back to let us know if he will be coming so we can officially put him on the schedule. Please advise.

## 2023-11-03 ENCOUNTER — OFFICE VISIT (OUTPATIENT)
Dept: FAMILY MEDICINE CLINIC | Age: 58
End: 2023-11-03
Payer: COMMERCIAL

## 2023-11-03 VITALS
DIASTOLIC BLOOD PRESSURE: 85 MMHG | BODY MASS INDEX: 40.2 KG/M2 | SYSTOLIC BLOOD PRESSURE: 129 MMHG | HEART RATE: 71 BPM | RESPIRATION RATE: 16 BRPM | TEMPERATURE: 98.2 F | OXYGEN SATURATION: 97 % | HEIGHT: 69 IN | WEIGHT: 271.4 LBS

## 2023-11-03 DIAGNOSIS — T14.8XXA SUPERFICIAL LACERATION: Primary | ICD-10-CM

## 2023-11-03 PROCEDURE — 90674 CCIIV4 VAC NO PRSV 0.5 ML IM: CPT | Performed by: FAMILY MEDICINE

## 2023-11-03 PROCEDURE — 3074F SYST BP LT 130 MM HG: CPT | Performed by: FAMILY MEDICINE

## 2023-11-03 PROCEDURE — 3078F DIAST BP <80 MM HG: CPT | Performed by: FAMILY MEDICINE

## 2023-11-03 PROCEDURE — 99212 OFFICE O/P EST SF 10 MIN: CPT | Performed by: FAMILY MEDICINE

## 2023-11-03 PROCEDURE — 90471 IMMUNIZATION ADMIN: CPT | Performed by: FAMILY MEDICINE

## 2023-11-10 RX ORDER — AMLODIPINE BESYLATE 10 MG/1
TABLET ORAL
Qty: 90 TABLET | Refills: 3 | Status: SHIPPED | OUTPATIENT
Start: 2023-11-10

## 2023-11-10 RX ORDER — OLMESARTAN MEDOXOMIL 40 MG/1
TABLET ORAL
Qty: 90 TABLET | Refills: 3 | Status: SHIPPED | OUTPATIENT
Start: 2023-11-10

## 2023-11-22 RX ORDER — TADALAFIL 5 MG/1
5 TABLET ORAL DAILY
Qty: 30 TABLET | Refills: 5 | Status: SHIPPED | OUTPATIENT
Start: 2023-11-22

## 2023-12-26 RX ORDER — ERGOCALCIFEROL 1.25 MG/1
CAPSULE ORAL
Qty: 24 CAPSULE | Refills: 1 | Status: SHIPPED | OUTPATIENT
Start: 2023-12-26

## 2024-03-04 DIAGNOSIS — I10 ESSENTIAL HYPERTENSION: ICD-10-CM

## 2024-03-04 RX ORDER — ORAL SEMAGLUTIDE 7 MG/1
TABLET ORAL
Qty: 90 TABLET | Refills: 3 | Status: SHIPPED | OUTPATIENT
Start: 2024-03-04

## 2024-03-04 RX ORDER — HYDROCHLOROTHIAZIDE 25 MG/1
TABLET ORAL
Qty: 90 TABLET | Refills: 3 | Status: SHIPPED | OUTPATIENT
Start: 2024-03-04

## 2024-04-02 DIAGNOSIS — I10 PRIMARY HYPERTENSION: Primary | ICD-10-CM

## 2024-04-02 DIAGNOSIS — E11.9 CONTROLLED TYPE 2 DIABETES MELLITUS WITHOUT COMPLICATION, WITHOUT LONG-TERM CURRENT USE OF INSULIN (HCC): ICD-10-CM

## 2024-04-02 SDOH — ECONOMIC STABILITY: FOOD INSECURITY: WITHIN THE PAST 12 MONTHS, THE FOOD YOU BOUGHT JUST DIDN'T LAST AND YOU DIDN'T HAVE MONEY TO GET MORE.: NEVER TRUE

## 2024-04-02 SDOH — ECONOMIC STABILITY: INCOME INSECURITY: HOW HARD IS IT FOR YOU TO PAY FOR THE VERY BASICS LIKE FOOD, HOUSING, MEDICAL CARE, AND HEATING?: NOT HARD AT ALL

## 2024-04-02 SDOH — ECONOMIC STABILITY: FOOD INSECURITY: WITHIN THE PAST 12 MONTHS, YOU WORRIED THAT YOUR FOOD WOULD RUN OUT BEFORE YOU GOT MONEY TO BUY MORE.: NEVER TRUE

## 2024-04-02 ASSESSMENT — PATIENT HEALTH QUESTIONNAIRE - PHQ9
SUM OF ALL RESPONSES TO PHQ QUESTIONS 1-9: 0
2. FEELING DOWN, DEPRESSED OR HOPELESS: NOT AT ALL
SUM OF ALL RESPONSES TO PHQ QUESTIONS 1-9: 0
SUM OF ALL RESPONSES TO PHQ QUESTIONS 1-9: 0
SUM OF ALL RESPONSES TO PHQ9 QUESTIONS 1 & 2: 0
1. LITTLE INTEREST OR PLEASURE IN DOING THINGS: NOT AT ALL
SUM OF ALL RESPONSES TO PHQ9 QUESTIONS 1 & 2: 0
2. FEELING DOWN, DEPRESSED OR HOPELESS: NOT AT ALL
1. LITTLE INTEREST OR PLEASURE IN DOING THINGS: NOT AT ALL
SUM OF ALL RESPONSES TO PHQ QUESTIONS 1-9: 0

## 2024-04-03 DIAGNOSIS — I10 PRIMARY HYPERTENSION: ICD-10-CM

## 2024-04-03 DIAGNOSIS — E11.9 CONTROLLED TYPE 2 DIABETES MELLITUS WITHOUT COMPLICATION, WITHOUT LONG-TERM CURRENT USE OF INSULIN (HCC): ICD-10-CM

## 2024-04-03 LAB
ALBUMIN SERPL-MCNC: 4.2 G/DL (ref 3.5–5.2)
ALP BLD-CCNC: 67 U/L (ref 40–129)
ALT SERPL-CCNC: 45 U/L (ref 0–40)
ANION GAP SERPL CALCULATED.3IONS-SCNC: 13 MMOL/L (ref 7–16)
AST SERPL-CCNC: 24 U/L (ref 0–39)
BASOPHILS ABSOLUTE: 0.01 K/UL (ref 0–0.2)
BASOPHILS RELATIVE PERCENT: 0 % (ref 0–2)
BILIRUB SERPL-MCNC: 0.5 MG/DL (ref 0–1.2)
BUN BLDV-MCNC: 18 MG/DL (ref 6–20)
CALCIUM SERPL-MCNC: 9.6 MG/DL (ref 8.6–10.2)
CHLORIDE BLD-SCNC: 105 MMOL/L (ref 98–107)
CHOLESTEROL: 185 MG/DL
CO2: 23 MMOL/L (ref 22–29)
CREAT SERPL-MCNC: 0.9 MG/DL (ref 0.7–1.2)
EOSINOPHILS ABSOLUTE: 0.19 K/UL (ref 0.05–0.5)
EOSINOPHILS RELATIVE PERCENT: 2 % (ref 0–6)
GFR SERPL CREATININE-BSD FRML MDRD: >90 ML/MIN/1.73M2
GLUCOSE BLD-MCNC: 129 MG/DL (ref 74–99)
HBA1C MFR BLD: 5.9 % (ref 4–5.6)
HCT VFR BLD CALC: 46.1 % (ref 37–54)
HDLC SERPL-MCNC: 43 MG/DL
HEMOGLOBIN: 16.1 G/DL (ref 12.5–16.5)
IMMATURE GRANULOCYTES %: 1 % (ref 0–5)
IMMATURE GRANULOCYTES ABSOLUTE: 0.04 K/UL (ref 0–0.58)
LDL CHOLESTEROL: 119 MG/DL
LYMPHOCYTES ABSOLUTE: 1.63 K/UL (ref 1.5–4)
LYMPHOCYTES RELATIVE PERCENT: 19 % (ref 20–42)
MCH RBC QN AUTO: 30.8 PG (ref 26–35)
MCHC RBC AUTO-ENTMCNC: 34.9 G/DL (ref 32–34.5)
MCV RBC AUTO: 88.3 FL (ref 80–99.9)
MONOCYTES ABSOLUTE: 0.88 K/UL (ref 0.1–0.95)
MONOCYTES RELATIVE PERCENT: 10 % (ref 2–12)
NEUTROPHILS ABSOLUTE: 6.08 K/UL (ref 1.8–7.3)
NEUTROPHILS RELATIVE PERCENT: 69 % (ref 43–80)
PDW BLD-RTO: 12.8 % (ref 11.5–15)
PLATELET # BLD: 198 K/UL (ref 130–450)
PMV BLD AUTO: 10 FL (ref 7–12)
POTASSIUM SERPL-SCNC: 3.8 MMOL/L (ref 3.5–5)
RBC # BLD: 5.22 M/UL (ref 3.8–5.8)
SODIUM BLD-SCNC: 141 MMOL/L (ref 132–146)
TOTAL PROTEIN: 6.7 G/DL (ref 6.4–8.3)
TRIGL SERPL-MCNC: 115 MG/DL
VLDLC SERPL CALC-MCNC: 23 MG/DL
WBC # BLD: 8.8 K/UL (ref 4.5–11.5)

## 2024-04-03 RX ORDER — POTASSIUM CHLORIDE 20 MEQ/1
TABLET, EXTENDED RELEASE ORAL
Qty: 180 TABLET | Refills: 3 | Status: SHIPPED | OUTPATIENT
Start: 2024-04-03

## 2024-04-05 ENCOUNTER — OFFICE VISIT (OUTPATIENT)
Dept: FAMILY MEDICINE CLINIC | Age: 59
End: 2024-04-05
Payer: COMMERCIAL

## 2024-04-05 VITALS
WEIGHT: 273.5 LBS | RESPIRATION RATE: 16 BRPM | DIASTOLIC BLOOD PRESSURE: 84 MMHG | OXYGEN SATURATION: 97 % | TEMPERATURE: 97.5 F | HEIGHT: 69 IN | SYSTOLIC BLOOD PRESSURE: 118 MMHG | BODY MASS INDEX: 40.51 KG/M2 | HEART RATE: 79 BPM

## 2024-04-05 DIAGNOSIS — I10 ESSENTIAL HYPERTENSION: ICD-10-CM

## 2024-04-05 DIAGNOSIS — E66.01 CLASS 2 SEVERE OBESITY DUE TO EXCESS CALORIES WITH SERIOUS COMORBIDITY AND BODY MASS INDEX (BMI) OF 37.0 TO 37.9 IN ADULT (HCC): ICD-10-CM

## 2024-04-05 DIAGNOSIS — E11.9 CONTROLLED TYPE 2 DIABETES MELLITUS WITHOUT COMPLICATION, WITHOUT LONG-TERM CURRENT USE OF INSULIN (HCC): Primary | ICD-10-CM

## 2024-04-05 PROCEDURE — 3079F DIAST BP 80-89 MM HG: CPT | Performed by: FAMILY MEDICINE

## 2024-04-05 PROCEDURE — 82044 UR ALBUMIN SEMIQUANTITATIVE: CPT | Performed by: FAMILY MEDICINE

## 2024-04-05 PROCEDURE — 3074F SYST BP LT 130 MM HG: CPT | Performed by: FAMILY MEDICINE

## 2024-04-05 PROCEDURE — 3044F HG A1C LEVEL LT 7.0%: CPT | Performed by: FAMILY MEDICINE

## 2024-04-05 PROCEDURE — 99214 OFFICE O/P EST MOD 30 MIN: CPT | Performed by: FAMILY MEDICINE

## 2024-04-05 RX ORDER — SEMAGLUTIDE 0.68 MG/ML
0.5 INJECTION, SOLUTION SUBCUTANEOUS WEEKLY
Qty: 3 ML | Refills: 0 | Status: SHIPPED | OUTPATIENT
Start: 2024-04-05

## 2024-04-05 RX ORDER — TADALAFIL 5 MG/1
5 TABLET ORAL DAILY
Qty: 30 TABLET | Refills: 5 | Status: SHIPPED | OUTPATIENT
Start: 2024-04-05

## 2024-04-05 NOTE — PROGRESS NOTES
[facility] INPATIENT ENCOUNTER  [facility] DISCHARGE SUMMARY NOTE    ADMISSION DATE:  9/26/2019  DISCHARGE DATE:  No discharge date for patient encounter.  DISCHARGING PHYSICIAN:  Braulio Hernandez MD  ATTENDING PHYSICIAN:  Braulio Hernandez MD    DISCHARGE DIAGNOSIS:     Patient Active Problem List   Diagnosis   • Depression with anxiety   • GERD (gastroesophageal reflux disease)   • Chronic constipation   • Hypokalemia   • Hypomagnesemia   • Intractable vomiting with nausea   • Abdominal pain   • Tobacco use         OTHER DIAGNOSES:    Past Medical History:   Diagnosis Date   • Depression    • Depression with anxiety 12/23/2014   • Diverticulosis 06/28/2019    Extensive    • Failed moderate sedation during procedure     high tolerance   • Fracture    • GERD (gastroesophageal reflux disease) 5/12/2015    EGD 12/2014    • Liver disease     enlarged   • Nausea    • Sprain of tibiofibular ligament of left ankle 7/28/2015   • Weight loss     approx 50 pounds in one year       DISCHARGE DISPOSITION:    home    CONDITION AT DISCHARGE:    good    DISCHARGE INSTRUCTIONS:    DISCHARGE MEDICATIONS:  See Discharge Medication Reconciliation List  FOLLOW-UP:  No follow-ups on file.  DIET:  regular diet  ACTIVITY:  activity as tolerated  WOUND CARE:  none needed  SPECIAL INSTRUCTIONS:        READMISSION RISK FACTORS:    Problem Diagnosis    REASON FOR ADMISSION:    Mariah Marquez is a 43 year old female who was admitted on 9/26/2019 with intractable nausea and vomiting    HOSPITAL COURSE:    The patient was admitted to the [facility] for intractable nausea and vomiting.  She was seen in consultation by the GI service.  The patient was hydrated with IV fluids.  Her nausea and vomiting eventually improved.  A gastric emptying study has been ordered by GI as an outpatient.  She has been tolerating a diet well at this point she has been cleared for discharge by GI.    The patient was found to have prolonged QT  cardiology was consulted and echocardiogram was done.  Evaluation was negative and was thought to be related to the hypomagnesemia and hypokalemia which has been corrected.  The patient has no cardiac symptoms.    She does have elevated liver function studies which is likely alcohol related.  She will follow up with primary care physician and GI.    OBJECTIVE:    VITALS:    Patient Vitals for the past 24 hrs:   BP Temp Temp src Pulse Resp SpO2 Weight   09/29/19 0900 -- -- -- -- -- -- 49 kg   09/29/19 0800 134/89 97.9 °F (36.6 °C) Temporal 104 17 93 % --   09/29/19 0500 131/86 98.3 °F (36.8 °C) Temporal 98 18 95 % --   09/29/19 0138 (!) 143/101 98.2 °F (36.8 °C) Temporal 101 18 95 % --   09/28/19 1915 132/82 98.1 °F (36.7 °C) Temporal 109 18 92 % --   09/28/19 1644 132/89 98.9 °F (37.2 °C) Oral 98 16 98 % --        PHYSICAL EXAM:    See progress note    SIGNIFICANT FINDINGS/COMPLICATIONS:    None    CONSULTS:    GI  Cardiology    PROCEDURES:    See epic    SIGNIFICANT DIAGNOSTIC STUDIES:    See epic    CASE DISCUSSED WITH:  Patient and RN    TIME TAKEN FOR DISCHARGE:  45 minutes    Discharge instructions, medications and followup appointment were discussed with the patient and after visit summary was given.        sounds  Extremities:  No clubbing, cyanosis or edema  Skin: unremarkable    Assessment/Plan:      Gonzalo was seen today for hypertension.    Diagnoses and all orders for this visit:    Controlled type 2 diabetes mellitus without complication, without long-term current use of insulin ([facility])  -     POCT microalbumin    Class 2 severe obesity due to excess calories with serious comorbidity and body mass index (BMI) of 37.0 to 37.9 in adult (HCC)    Essential hypertension    Other orders  -     tadalafil (CIALIS) 5 MG tablet; Take 1 tablet by mouth daily  -     Semaglutide,0.25 or 0.5MG/DOS, (OZEMPIC, 0.25 OR 0.5 MG/DOSE,) 2 MG/3ML SOPN; Inject 0.5 mg into the skin once a week      As above.  Call or go to ED immediately if symptoms worsen or persist.  No follow-ups on file., or sooner if necessary.      Educational materials and/or home exercises printed for patient's review and were included in patient instructions on his/her After Visit Summary and given to patient at the end of visit.      Counseled regarding above diagnosis, including possible risks and complications,  especially if left uncontrolled.    Counseled regarding the possible side effects, risks, benefits and alternatives to treatment; patient and/or guardian verbalizes understanding, agrees, feels comfortable with and wishes to proceed with above treatment plan.    Advised patient to call with any new medication issues, and read all Rx info from pharmacy to assure aware of all possible risks and side effects of medication before taking.    Reviewed age and gender appropriate health screening exams and vaccinations.  Advised patient regarding importance of keeping up with recommended health maintenance and to schedule as soon as possible if overdue, as this is important in assessing for undiagnosed pathology, especially cancer, as well as protecting against potentially harmful/life threatening disease.        Patient and/or guardian verbalizes

## 2024-04-30 RX ORDER — SEMAGLUTIDE 0.68 MG/ML
INJECTION, SOLUTION SUBCUTANEOUS
Qty: 3 ML | Refills: 3 | Status: SHIPPED | OUTPATIENT
Start: 2024-04-30

## 2024-06-03 RX ORDER — ERGOCALCIFEROL 1.25 MG/1
CAPSULE ORAL
Qty: 24 CAPSULE | Refills: 1 | Status: SHIPPED | OUTPATIENT
Start: 2024-06-03

## 2024-10-22 DIAGNOSIS — I10 PRIMARY HYPERTENSION: Primary | ICD-10-CM

## 2024-10-22 DIAGNOSIS — E11.9 CONTROLLED TYPE 2 DIABETES MELLITUS WITHOUT COMPLICATION, WITHOUT LONG-TERM CURRENT USE OF INSULIN (HCC): ICD-10-CM

## 2024-10-22 DIAGNOSIS — G47.33 OSA (OBSTRUCTIVE SLEEP APNEA): Primary | ICD-10-CM

## 2024-10-28 ENCOUNTER — NURSE ONLY (OUTPATIENT)
Dept: FAMILY MEDICINE CLINIC | Age: 59
End: 2024-10-28

## 2024-11-07 RX ORDER — AMLODIPINE BESYLATE 10 MG/1
TABLET ORAL
Qty: 90 TABLET | Refills: 3 | Status: SHIPPED | OUTPATIENT
Start: 2024-11-07

## 2024-11-07 RX ORDER — OLMESARTAN MEDOXOMIL 40 MG/1
TABLET ORAL
Qty: 90 TABLET | Refills: 3 | Status: SHIPPED | OUTPATIENT
Start: 2024-11-07

## 2024-11-13 DIAGNOSIS — I10 PRIMARY HYPERTENSION: ICD-10-CM

## 2024-11-13 DIAGNOSIS — E11.9 CONTROLLED TYPE 2 DIABETES MELLITUS WITHOUT COMPLICATION, WITHOUT LONG-TERM CURRENT USE OF INSULIN (HCC): ICD-10-CM

## 2024-11-13 LAB
ALBUMIN: 4.2 G/DL (ref 3.5–5.2)
ALP BLD-CCNC: 76 U/L (ref 40–129)
ALT SERPL-CCNC: 44 U/L (ref 0–40)
ANION GAP SERPL CALCULATED.3IONS-SCNC: 15 MMOL/L (ref 7–16)
AST SERPL-CCNC: 30 U/L (ref 0–39)
BASOPHILS ABSOLUTE: 0.01 K/UL (ref 0–0.2)
BASOPHILS RELATIVE PERCENT: 0 % (ref 0–2)
BILIRUB SERPL-MCNC: 0.6 MG/DL (ref 0–1.2)
BUN BLDV-MCNC: 13 MG/DL (ref 6–20)
CALCIUM SERPL-MCNC: 9.5 MG/DL (ref 8.6–10.2)
CHLORIDE BLD-SCNC: 104 MMOL/L (ref 98–107)
CHOLESTEROL, TOTAL: 195 MG/DL
CO2: 24 MMOL/L (ref 22–29)
CREAT SERPL-MCNC: 0.9 MG/DL (ref 0.7–1.2)
EOSINOPHILS ABSOLUTE: 0.14 K/UL (ref 0.05–0.5)
EOSINOPHILS RELATIVE PERCENT: 2 % (ref 0–6)
GFR, ESTIMATED: >90 ML/MIN/1.73M2
GLUCOSE BLD-MCNC: 148 MG/DL (ref 74–99)
HBA1C MFR BLD: 6.2 % (ref 4–5.6)
HCT VFR BLD CALC: 47.3 % (ref 37–54)
HDLC SERPL-MCNC: 49 MG/DL
HEMOGLOBIN: 16.1 G/DL (ref 12.5–16.5)
IMMATURE GRANULOCYTES %: 1 % (ref 0–5)
IMMATURE GRANULOCYTES ABSOLUTE: 0.04 K/UL (ref 0–0.58)
LDL CHOLESTEROL: 127 MG/DL
LYMPHOCYTES ABSOLUTE: 1.43 K/UL (ref 1.5–4)
LYMPHOCYTES RELATIVE PERCENT: 19 % (ref 20–42)
MCH RBC QN AUTO: 30.3 PG (ref 26–35)
MCHC RBC AUTO-ENTMCNC: 34 G/DL (ref 32–34.5)
MCV RBC AUTO: 88.9 FL (ref 80–99.9)
MONOCYTES ABSOLUTE: 0.74 K/UL (ref 0.1–0.95)
MONOCYTES RELATIVE PERCENT: 10 % (ref 2–12)
NEUTROPHILS ABSOLUTE: 5.15 K/UL (ref 1.8–7.3)
NEUTROPHILS RELATIVE PERCENT: 69 % (ref 43–80)
PDW BLD-RTO: 12.9 % (ref 11.5–15)
PLATELET # BLD: 202 K/UL (ref 130–450)
PMV BLD AUTO: 9.9 FL (ref 7–12)
POTASSIUM SERPL-SCNC: 3.6 MMOL/L (ref 3.5–5)
RBC # BLD: 5.32 M/UL (ref 3.8–5.8)
SODIUM BLD-SCNC: 143 MMOL/L (ref 132–146)
TOTAL PROTEIN: 6.6 G/DL (ref 6.4–8.3)
TRIGL SERPL-MCNC: 96 MG/DL
TSH SERPL DL<=0.05 MIU/L-ACNC: 1.51 UIU/ML (ref 0.27–4.2)
VLDLC SERPL CALC-MCNC: 19 MG/DL
WBC # BLD: 7.5 K/UL (ref 4.5–11.5)

## 2024-11-15 ENCOUNTER — OFFICE VISIT (OUTPATIENT)
Dept: FAMILY MEDICINE CLINIC | Age: 59
End: 2024-11-15

## 2024-11-15 VITALS
WEIGHT: 271.7 LBS | OXYGEN SATURATION: 96 % | SYSTOLIC BLOOD PRESSURE: 134 MMHG | BODY MASS INDEX: 40.24 KG/M2 | HEIGHT: 69 IN | DIASTOLIC BLOOD PRESSURE: 86 MMHG | HEART RATE: 81 BPM | TEMPERATURE: 98.3 F

## 2024-11-15 DIAGNOSIS — E11.9 CONTROLLED TYPE 2 DIABETES MELLITUS WITHOUT COMPLICATION, WITHOUT LONG-TERM CURRENT USE OF INSULIN (HCC): ICD-10-CM

## 2024-11-15 DIAGNOSIS — Z00.00 ANNUAL PHYSICAL EXAM: ICD-10-CM

## 2024-11-15 DIAGNOSIS — I10 ESSENTIAL HYPERTENSION: Primary | ICD-10-CM

## 2024-11-15 NOTE — PROGRESS NOTES
Vitals:    11/15/24 1325   BP: 134/86   Pulse: 81   Temp: 98.3 °F (36.8 °C)   SpO2: 96%   Weight: 123.2 kg (271 lb 11.2 oz)   Height: 1.753 m (5' 9\")       General:  Patient alert and oriented x 3, NAD, pleasant  HEENT:  Atraumatic, normocephalic, PERRLA, EOMI, clear conjunctiva, TMs clear, nose-clear, throat - no erythema  Neck:  Supple, no goiter, no carotid bruits, no lymphadenopathy  Lungs:  CTA B  Heart:  RRR, no murmurs, gallops or rubs  Abdomen:  Soft/nt/nd, + bowel sounds  Extremities:  No clubbing, cyanosis or edema  Skin: unremarkable             Educational materials and/or home exercises printed for patient's review and were included in patient instructions on his/her After Visit Summary and given to patient at the end of visit.      Counseled regarding above diagnosis, including possible risks and complications,  especially if left uncontrolled.    Counseled regarding the possible side effects, risks, benefits and alternatives to treatment; patient and/or guardian verbalizes understanding, agrees, feels comfortable with and wishes to proceed with above treatment plan.    Advised patient to call with any new medication issues, and read all Rx info from pharmacy to assure aware of all possible risks and side effects of medication before taking.    Reviewed age and gender appropriate health screening exams and vaccinations.  Advised patient regarding importance of keeping up with recommended health maintenance and to schedule as soon as possible if overdue, as this is important in assessing for undiagnosed pathology, especially cancer, as well as protecting against potentially harmful/life threatening disease.        Patient and/or guardian verbalizes understanding and agrees with above counseling, assessment and plan.    All questions answered.      I have personally reviewed and updated the chief complaint, HPI, Past Medical, Family and Social History, as well as the above Review of Systems.     The

## 2024-11-21 RX ORDER — ERGOCALCIFEROL 1.25 MG/1
CAPSULE, LIQUID FILLED ORAL
Qty: 24 CAPSULE | Refills: 1 | Status: SHIPPED | OUTPATIENT
Start: 2024-11-21

## 2024-12-09 RX ORDER — TADALAFIL 5 MG/1
5 TABLET ORAL DAILY
Qty: 30 TABLET | Refills: 5 | Status: SHIPPED | OUTPATIENT
Start: 2024-12-09

## 2025-01-17 ENCOUNTER — OFFICE VISIT (OUTPATIENT)
Dept: FAMILY MEDICINE CLINIC | Age: 60
End: 2025-01-17
Payer: COMMERCIAL

## 2025-01-17 ENCOUNTER — HOSPITAL ENCOUNTER (OUTPATIENT)
Dept: ULTRASOUND IMAGING | Age: 60
Discharge: HOME | End: 2025-01-19
Attending: FAMILY MEDICINE
Payer: COMMERCIAL

## 2025-01-17 VITALS
DIASTOLIC BLOOD PRESSURE: 80 MMHG | TEMPERATURE: 97.3 F | RESPIRATION RATE: 18 BRPM | OXYGEN SATURATION: 97 % | WEIGHT: 269 LBS | HEIGHT: 69 IN | SYSTOLIC BLOOD PRESSURE: 110 MMHG | BODY MASS INDEX: 39.84 KG/M2 | HEART RATE: 75 BPM

## 2025-01-17 DIAGNOSIS — R60.0 LEG EDEMA: ICD-10-CM

## 2025-01-17 DIAGNOSIS — R60.0 LEG EDEMA: Primary | ICD-10-CM

## 2025-01-17 DIAGNOSIS — M71.22 SYNOVIAL CYST OF LEFT POPLITEAL SPACE: ICD-10-CM

## 2025-01-17 DIAGNOSIS — M79.605 PAIN OF LEFT LOWER EXTREMITY: ICD-10-CM

## 2025-01-17 PROCEDURE — 99214 OFFICE O/P EST MOD 30 MIN: CPT | Performed by: FAMILY MEDICINE

## 2025-01-17 PROCEDURE — 93971 EXTREMITY STUDY: CPT

## 2025-01-17 PROCEDURE — 3079F DIAST BP 80-89 MM HG: CPT | Performed by: FAMILY MEDICINE

## 2025-01-17 PROCEDURE — 3074F SYST BP LT 130 MM HG: CPT | Performed by: FAMILY MEDICINE

## 2025-01-17 RX ORDER — NAPROXEN 250 MG/1
500 TABLET ORAL 2 TIMES DAILY WITH MEALS
Qty: 60 TABLET | Refills: 0 | Status: SHIPPED | OUTPATIENT
Start: 2025-01-17

## 2025-01-17 SDOH — ECONOMIC STABILITY: FOOD INSECURITY: WITHIN THE PAST 12 MONTHS, YOU WORRIED THAT YOUR FOOD WOULD RUN OUT BEFORE YOU GOT MONEY TO BUY MORE.: NEVER TRUE

## 2025-01-17 SDOH — ECONOMIC STABILITY: FOOD INSECURITY: WITHIN THE PAST 12 MONTHS, THE FOOD YOU BOUGHT JUST DIDN'T LAST AND YOU DIDN'T HAVE MONEY TO GET MORE.: NEVER TRUE

## 2025-01-17 ASSESSMENT — PATIENT HEALTH QUESTIONNAIRE - PHQ9
SUM OF ALL RESPONSES TO PHQ QUESTIONS 1-9: 0
1. LITTLE INTEREST OR PLEASURE IN DOING THINGS: NOT AT ALL
SUM OF ALL RESPONSES TO PHQ QUESTIONS 1-9: 0
SUM OF ALL RESPONSES TO PHQ QUESTIONS 1-9: 0
SUM OF ALL RESPONSES TO PHQ9 QUESTIONS 1 & 2: 0
2. FEELING DOWN, DEPRESSED OR HOPELESS: NOT AT ALL
SUM OF ALL RESPONSES TO PHQ QUESTIONS 1-9: 0

## 2025-01-17 NOTE — PROGRESS NOTES
EOMI, clear conjunctiva, TMs clear, nose-clear, throat - no erythema  Neck:  Supple, no goiter, no carotid bruits, no lymphadenopathy  Lungs:  CTA B  Heart:  RRR, no murmurs, gallops or rubs  Abdomen:  Soft/nt/nd, + bowel sounds  Extremities:  No clubbing, cyanosis or edema on RLE, LLE with swelling, pain posterior aspect of L knee extending into thigh and calf  Skin: unremarkable             Educational materials and/or home exercises printed for patient's review and were included in patient instructions on his/her After Visit Summary and given to patient at the end of visit.      Counseled regarding above diagnosis, including possible risks and complications,  especially if left uncontrolled.    Counseled regarding the possible side effects, risks, benefits and alternatives to treatment; patient and/or guardian verbalizes understanding, agrees, feels comfortable with and wishes to proceed with above treatment plan.    Advised patient to call with any new medication issues, and read all Rx info from pharmacy to assure aware of all possible risks and side effects of medication before taking.    Reviewed age and gender appropriate health screening exams and vaccinations.  Advised patient regarding importance of keeping up with recommended health maintenance and to schedule as soon as possible if overdue, as this is important in assessing for undiagnosed pathology, especially cancer, as well as protecting against potentially harmful/life threatening disease.        Patient and/or guardian verbalizes understanding and agrees with above counseling, assessment and plan.    All questions answered.      I have personally reviewed and updated the chief complaint, HPI, Past Medical, Family and Social History, as well as the above Review of Systems.     The patient (or guardian, if applicable) and other individuals in attendance with the patient were advised that Artificial Intelligence will be utilized during this visit to

## 2025-01-23 ENCOUNTER — OFFICE VISIT (OUTPATIENT)
Dept: FAMILY MEDICINE CLINIC | Age: 60
End: 2025-01-23
Payer: COMMERCIAL

## 2025-01-23 VITALS
TEMPERATURE: 97 F | BODY MASS INDEX: 40.3 KG/M2 | OXYGEN SATURATION: 97 % | RESPIRATION RATE: 16 BRPM | DIASTOLIC BLOOD PRESSURE: 80 MMHG | HEART RATE: 67 BPM | HEIGHT: 69 IN | SYSTOLIC BLOOD PRESSURE: 128 MMHG | WEIGHT: 272.1 LBS

## 2025-01-23 DIAGNOSIS — R60.0 LEG EDEMA: ICD-10-CM

## 2025-01-23 DIAGNOSIS — L98.9 LEG LESION: Primary | ICD-10-CM

## 2025-01-23 PROCEDURE — 3074F SYST BP LT 130 MM HG: CPT | Performed by: FAMILY MEDICINE

## 2025-01-23 PROCEDURE — 3079F DIAST BP 80-89 MM HG: CPT | Performed by: FAMILY MEDICINE

## 2025-01-23 PROCEDURE — 99213 OFFICE O/P EST LOW 20 MIN: CPT | Performed by: FAMILY MEDICINE

## 2025-01-23 NOTE — PROGRESS NOTES
Gonzalo Meza (:  1965) is a 59 y.o. male, Established patient, here for evaluation of the following chief complaint(s):  Leg Swelling (U/S that he had done did not show a blood clot but did show a large cyst. /Still swollen, still painful but better than last week. ) and Eye Pain (Left eye, while turning in bed, states that after the pain it felt like something was in his eye lid- states that it has to continued to bother him and his vision has been blurry, but it is better today. He called here and spoke with the front and they told him to keep his appt today but he did also get an appt with his eye doctor for tomorrow. )         Assessment & Plan  1. Baker's cyst in the left knee.  The patient's symptoms do not align with the typical presentation of a Baker's cyst, raising concerns about potential missed diagnoses. The presence of bruising is particularly perplexing, as it is not commonly associated with a Baker's cyst. A consultation with an orthopedic specialist will be arranged to discuss the case and determine the most appropriate imaging modality for further evaluation. The specialist will also review the current imaging studies.    2. Left eye pain and vision changes.  The absence of photopsia or metamorphopsia suggests that the retina remains intact. The fact that his vision was impaired yesterday but has shown improvement today is a positive sign. It is possible that the symptoms were due to an acute rise in intraocular pressure, which can cause pain and blurry vision. A slit lamp examination will be conducted to further evaluate the condition. An attempt will be made to expedite his appointment at the [facility]. If this is not feasible, the existing appointment for tomorrow will be maintained.    Results  Imaging  Ultrasound of the left knee shows a large Baker's cyst extending up.  Gonzalo was seen today for leg swelling and eye pain.    Diagnoses and all orders for this visit:    Leg

## 2025-01-27 ENCOUNTER — HOSPITAL ENCOUNTER (OUTPATIENT)
Dept: ULTRASOUND IMAGING | Age: 60
Discharge: HOME | End: 2025-01-29
Attending: FAMILY MEDICINE
Payer: COMMERCIAL

## 2025-01-27 DIAGNOSIS — R60.0 LEG EDEMA: ICD-10-CM

## 2025-01-27 DIAGNOSIS — L98.9 LEG LESION: ICD-10-CM

## 2025-01-27 PROCEDURE — 93971 EXTREMITY STUDY: CPT

## 2025-01-28 DIAGNOSIS — M71.22 BAKER'S CYST OF KNEE, LEFT: Primary | ICD-10-CM

## 2025-02-07 ENCOUNTER — TELEPHONE (OUTPATIENT)
Dept: FAMILY MEDICINE CLINIC | Age: 60
End: 2025-02-07

## 2025-02-07 NOTE — TELEPHONE ENCOUNTER
Martin from [facility] called and wanted us to know that they will be setting up his new cpap machine with the old cpap settings and pressure.  If the settings or pressure should be different Martin can be reached at [phone]

## 2025-02-20 DIAGNOSIS — I10 ESSENTIAL HYPERTENSION: ICD-10-CM

## 2025-02-20 RX ORDER — ORAL SEMAGLUTIDE 7 MG/1
TABLET ORAL
Qty: 90 TABLET | Refills: 3 | Status: SHIPPED | OUTPATIENT
Start: 2025-02-20

## 2025-02-20 RX ORDER — HYDROCHLOROTHIAZIDE 25 MG/1
TABLET ORAL
Qty: 90 TABLET | Refills: 3 | Status: SHIPPED | OUTPATIENT
Start: 2025-02-20

## 2025-03-24 RX ORDER — POTASSIUM CHLORIDE 1500 MG/1
40 TABLET, EXTENDED RELEASE ORAL DAILY
Qty: 180 TABLET | Refills: 3 | Status: SHIPPED | OUTPATIENT
Start: 2025-03-24

## 2025-05-01 ENCOUNTER — TELEPHONE (OUTPATIENT)
Dept: VASCULAR SURGERY | Age: 60
End: 2025-05-01

## 2025-05-01 NOTE — TELEPHONE ENCOUNTER
Received referral from Dr. Lan regarding venous insufficiency, left message for patient to return call to schedule.

## 2025-05-02 ENCOUNTER — OFFICE VISIT (OUTPATIENT)
Dept: FAMILY MEDICINE CLINIC | Age: 60
End: 2025-05-02
Payer: COMMERCIAL

## 2025-05-02 VITALS
TEMPERATURE: 98.5 F | HEART RATE: 97 BPM | DIASTOLIC BLOOD PRESSURE: 86 MMHG | WEIGHT: 269 LBS | BODY MASS INDEX: 39.84 KG/M2 | HEIGHT: 69 IN | OXYGEN SATURATION: 99 % | SYSTOLIC BLOOD PRESSURE: 132 MMHG

## 2025-05-02 DIAGNOSIS — E66.01 CLASS 2 SEVERE OBESITY DUE TO EXCESS CALORIES WITH SERIOUS COMORBIDITY AND BODY MASS INDEX (BMI) OF 39.0 TO 39.9 IN ADULT (HCC): ICD-10-CM

## 2025-05-02 DIAGNOSIS — E66.812 CLASS 2 SEVERE OBESITY DUE TO EXCESS CALORIES WITH SERIOUS COMORBIDITY AND BODY MASS INDEX (BMI) OF 39.0 TO 39.9 IN ADULT (HCC): ICD-10-CM

## 2025-05-02 DIAGNOSIS — E11.9 CONTROLLED TYPE 2 DIABETES MELLITUS WITHOUT COMPLICATION, WITHOUT LONG-TERM CURRENT USE OF INSULIN (HCC): Primary | ICD-10-CM

## 2025-05-02 DIAGNOSIS — I10 ESSENTIAL HYPERTENSION: ICD-10-CM

## 2025-05-02 DIAGNOSIS — M79.605 PAIN OF LEFT LOWER EXTREMITY: ICD-10-CM

## 2025-05-02 LAB — HBA1C MFR BLD: 5.4 %

## 2025-05-02 PROCEDURE — 83036 HEMOGLOBIN GLYCOSYLATED A1C: CPT | Performed by: FAMILY MEDICINE

## 2025-05-02 PROCEDURE — 3044F HG A1C LEVEL LT 7.0%: CPT | Performed by: FAMILY MEDICINE

## 2025-05-02 PROCEDURE — 3079F DIAST BP 80-89 MM HG: CPT | Performed by: FAMILY MEDICINE

## 2025-05-02 PROCEDURE — 3075F SYST BP GE 130 - 139MM HG: CPT | Performed by: FAMILY MEDICINE

## 2025-05-02 PROCEDURE — 90677 PCV20 VACCINE IM: CPT | Performed by: FAMILY MEDICINE

## 2025-05-02 PROCEDURE — 90471 IMMUNIZATION ADMIN: CPT | Performed by: FAMILY MEDICINE

## 2025-05-02 PROCEDURE — 99214 OFFICE O/P EST MOD 30 MIN: CPT | Performed by: FAMILY MEDICINE

## 2025-05-02 RX ORDER — ERGOCALCIFEROL 1.25 MG/1
CAPSULE, LIQUID FILLED ORAL
Qty: 24 CAPSULE | Refills: 1 | Status: SHIPPED | OUTPATIENT
Start: 2025-05-02

## 2025-05-02 NOTE — PROGRESS NOTES
Gonzalo Meza (:  1965) is a 59 y.o. male, Established patient, here for evaluation of the following chief complaint(s):  Diabetes         Assessment & Plan  1. Diabetes mellitus.  - A1c levels have shown significant improvement, currently at 5.4, down from the previous 6.2.  - Weight loss of approximately 4 pounds has been achieved.  - A urine sample will be collected today for microalbumin testing to monitor for early signs of kidney disease.  - No issues reported with current medications; no refills needed.    2. Baker's cysts.  - History of Baker's cysts in the back of the knee, previously drained.  - MRI showed no concerning findings, and the cysts have reduced in size.  - Arthritis will be managed with periodic injections as advised by Dr. Lan.  - Referral to a vascular specialist due to concerns about a potential vascular issue.    3. Blurred vision.  - Ongoing issues with blurred vision in the left eye despite previous treatments with neomycin, polymyxin, and dexamethasone.  - Vision fluctuates, with some days better than others, and associated mild pain and redness.  - Referral to [facility] for further evaluation by an ophthalmologist.  - Possible need for a retinal specialist if initial evaluation indicates.    4. Health maintenance.  - Increased risk of pneumonia due to diabetes and frequent travel.  - Will receive a pneumonia vaccine today.  - Follow-up scheduled in 6 months.    Results  Labs   - A1c: 5.4    Imaging   - Ultrasound of the knee: Two large Baker cysts found in the back of the knee   - X-ray of the knee: Arthritis found in the knee   - MRI of the knee: 2025, No concerning findings, cysts are smaller  Gonzalo was seen today for diabetes.    Diagnoses and all orders for this visit:    Controlled type 2 diabetes mellitus without complication, without long-term current use of insulin ([facility])  -     POCT glycosylated hemoglobin (Hb A1C)  -     Albumin/Creatinine Ratio,

## 2025-05-03 LAB
CREATININE URINE: 57.5 MG/DL (ref 40–278)
MICROALBUMIN/CREAT 24H UR: <12 MG/L (ref 0–20)
MICROALBUMIN/CREAT UR-RTO: <21 MCG/MG CREAT (ref 0–30)

## 2025-06-09 RX ORDER — TADALAFIL 5 MG/1
5 TABLET ORAL DAILY
Qty: 30 TABLET | Refills: 5 | Status: SHIPPED | OUTPATIENT
Start: 2025-06-09

## 2025-06-17 ENCOUNTER — OFFICE VISIT (OUTPATIENT)
Dept: VASCULAR SURGERY | Age: 60
End: 2025-06-17
Payer: COMMERCIAL

## 2025-06-17 DIAGNOSIS — R60.0 BILATERAL LEG EDEMA: Primary | ICD-10-CM

## 2025-06-17 PROCEDURE — 99203 OFFICE O/P NEW LOW 30 MIN: CPT | Performed by: SURGERY

## 2025-06-17 NOTE — PROGRESS NOTES
Vascular Surgery Outpatient Consultation      Chief Complaint   Patient presents with    Surgical Consult     vascular insuf.       Reason for Consult: Vascular insufficiency    Requesting Physician:  Dr. Ceron    HISTORY OF PRESENT ILLNESS:                The patient is a 59 y.o. male who is referred for evaluation of vascular insufficiency.  The patient has a history of leg swelling worse on the left compared to the right.  He has a history of a ruptured Baker's cyst on the left that caused significant swelling.  There was concern for vascular insufficiency.  He denies a history of peripheral vascular disease.  He denies a history of DVT or phlebitis.  He denies pain with ambulation.  His swelling improves with leg elevation.  He states that he works as an  and sits in front of his computer for a majority of the day.  He states his weight has been stable over the past 2 years.    Past Medical History:        Diagnosis Date    Encounter for screening colonoscopy 01/2018    Hepatitis     Hepatitis C antibody    Hypertension     Pain in both feet 4/16/2021    Sleep apnea with use of continuous positive airway pressure (CPAP)      Past Surgical History:        Procedure Laterality Date    COLONOSCOPY  01/22/2018    ELBOW SURGERY  2007    fall    FOOT SURGERY  2007    fall    WRIST SURGERY  2007    fall from ladder     Current Medications:   Prior to Admission medications    Medication Sig Start Date End Date Taking? Authorizing Provider   tadalafil (CIALIS) 5 MG tablet TAKE 1 TABLET BY MOUTH EVERY DAY 6/9/25  Yes Elaine Ceron MD   vitamin D (ERGOCALCIFEROL) 1.25 MG (99549 UT) CAPS capsule TAKE 1 CAPSULE TWICE WEEKLY 5/2/25  Yes Elaine Ceron MD   KLOR-CON M20 20 MEQ extended release tablet TAKE 2 TABLETS DAILY 3/24/25  Yes Elaine Ceron MD   RYBELSUS 7 MG TABS TAKE 1 TABLET DAILY 2/20/25  Yes Elaine Ceron MD   hydroCHLOROthiazide (HYDRODIURIL) 25 MG tablet TAKE 1 TABLET